# Patient Record
Sex: FEMALE | Race: WHITE
[De-identification: names, ages, dates, MRNs, and addresses within clinical notes are randomized per-mention and may not be internally consistent; named-entity substitution may affect disease eponyms.]

---

## 2019-04-01 ENCOUNTER — APPOINTMENT (OUTPATIENT)
Dept: OTOLARYNGOLOGY | Facility: CLINIC | Age: 74
End: 2019-04-01
Payer: MEDICARE

## 2019-04-01 VITALS
BODY MASS INDEX: 33.32 KG/M2 | DIASTOLIC BLOOD PRESSURE: 69 MMHG | WEIGHT: 200 LBS | HEIGHT: 65 IN | HEART RATE: 67 BPM | SYSTOLIC BLOOD PRESSURE: 135 MMHG

## 2019-04-01 DIAGNOSIS — Z87.09 PERSONAL HISTORY OF OTHER DISEASES OF THE RESPIRATORY SYSTEM: ICD-10-CM

## 2019-04-01 DIAGNOSIS — H90.5 UNSPECIFIED SENSORINEURAL HEARING LOSS: ICD-10-CM

## 2019-04-01 DIAGNOSIS — Z82.49 FAMILY HISTORY OF ISCHEMIC HEART DISEASE AND OTHER DISEASES OF THE CIRCULATORY SYSTEM: ICD-10-CM

## 2019-04-01 DIAGNOSIS — Z86.79 PERSONAL HISTORY OF OTHER DISEASES OF THE CIRCULATORY SYSTEM: ICD-10-CM

## 2019-04-01 DIAGNOSIS — Z82.0 FAMILY HISTORY OF EPILEPSY AND OTHER DISEASES OF THE NERVOUS SYSTEM: ICD-10-CM

## 2019-04-01 DIAGNOSIS — Z80.9 FAMILY HISTORY OF MALIGNANT NEOPLASM, UNSPECIFIED: ICD-10-CM

## 2019-04-01 DIAGNOSIS — Z86.39 PERSONAL HISTORY OF OTHER ENDOCRINE, NUTRITIONAL AND METABOLIC DISEASE: ICD-10-CM

## 2019-04-01 DIAGNOSIS — Z87.39 PERSONAL HISTORY OF OTHER DISEASES OF THE MUSCULOSKELETAL SYSTEM AND CONNECTIVE TISSUE: ICD-10-CM

## 2019-04-01 DIAGNOSIS — Z78.9 OTHER SPECIFIED HEALTH STATUS: ICD-10-CM

## 2019-04-01 PROCEDURE — 99203 OFFICE O/P NEW LOW 30 MIN: CPT | Mod: 25

## 2019-04-01 PROCEDURE — 69801 INCISE INNER EAR: CPT | Mod: LT

## 2019-04-01 RX ORDER — LISINOPRIL AND HYDROCHLOROTHIAZIDE TABLETS 10; 12.5 MG/1; MG/1
TABLET ORAL
Refills: 0 | Status: ACTIVE | COMMUNITY

## 2019-04-01 RX ORDER — OMEGA-3/DHA/EPA/FISH OIL 300-1000MG
CAPSULE ORAL
Refills: 0 | Status: ACTIVE | COMMUNITY

## 2019-04-01 RX ORDER — METFORMIN HYDROCHLORIDE 625 MG/1
TABLET ORAL
Refills: 0 | Status: ACTIVE | COMMUNITY

## 2019-04-01 RX ORDER — COLCHICINE 0.6 MG/1
TABLET ORAL
Refills: 0 | Status: ACTIVE | COMMUNITY

## 2019-04-01 RX ORDER — REPAGLINIDE 1 MG/1
TABLET ORAL
Refills: 0 | Status: ACTIVE | COMMUNITY

## 2019-04-01 RX ORDER — COLD-HOT PACK
EACH MISCELLANEOUS
Refills: 0 | Status: ACTIVE | COMMUNITY

## 2019-04-01 RX ORDER — METOPROLOL TARTRATE 75 MG/1
TABLET, FILM COATED ORAL
Refills: 0 | Status: ACTIVE | COMMUNITY

## 2019-04-01 RX ORDER — GABAPENTIN 100 MG/1
CAPSULE ORAL
Refills: 0 | Status: ACTIVE | COMMUNITY

## 2019-04-01 RX ORDER — AMLODIPINE BESYLATE 5 MG/1
TABLET ORAL
Refills: 0 | Status: ACTIVE | COMMUNITY

## 2019-04-01 RX ORDER — FUROSEMIDE 80 MG/1
TABLET ORAL
Refills: 0 | Status: ACTIVE | COMMUNITY

## 2019-04-01 RX ORDER — VITAMIN B COMPLEX
CAPSULE ORAL
Refills: 0 | Status: ACTIVE | COMMUNITY

## 2019-04-01 RX ORDER — LOSARTAN POTASSIUM 100 MG/1
TABLET, FILM COATED ORAL
Refills: 0 | Status: ACTIVE | COMMUNITY

## 2019-04-01 RX ORDER — GLIMEPIRIDE 4 MG/1
TABLET ORAL
Refills: 0 | Status: ACTIVE | COMMUNITY

## 2019-04-01 RX ORDER — ASPIRIN 81 MG
81 TABLET, DELAYED RELEASE (ENTERIC COATED) ORAL
Refills: 0 | Status: ACTIVE | COMMUNITY

## 2019-04-01 NOTE — DATA REVIEWED
[de-identified] : Recent audiometry provided and reviewed call in March 2019: Moderate to severe sensorineural hearing loss in the left ear with reduced word understanding.

## 2019-04-01 NOTE — PHYSICAL EXAM
[Midline] : trachea located in midline position [Normal] : no rashes [FreeTextEntry1] : Procedure: Microscopic Ear Exam\par \par Left ear:  Ear canal intact without inflammation or lesion.  \par Tympanic membrane intact without inflammation.\par \par Right ear:  Ear canal intact without inflammation or lesion.  \par Tympanic membrane intact without inflammation.\par \par Procedure: Left Intratympanic injection\par Management options reviewed in detail. All risks limitations complications and alternatives reviewed with the patient who agreed.\par Injection: dexamethasone 24mg/ml  0.3cc used\par Anesthesia: topical phenol \par Dwell Time: 20 Minutes\par Estimated Blood Loss: None\par Complications: None \par

## 2019-04-01 NOTE — ASSESSMENT
[FreeTextEntry1] : \par Sudden hearing loss left ear with concurrent vertigo. Vertigo has resolved. A significant hearing loss remains.\par \par Intratympanic steroid injection today.\par Further systemic steroids avoided due to diabetes. Followup recommended with repeat on Renae treat.\par \par MRI of the internal auditory canals requested.\par \par Repeat on audiometry next visit. Consider second injection.

## 2019-04-01 NOTE — HISTORY OF PRESENT ILLNESS
[de-identified] : LIYAH ZENDEJAS has a history of sudden vertigo and hearing loss on February 21 2019.  This included vomiting.  Treated with oral antibiotics. Vomiting improved but hearing did not.  Shell sound in the ear persisted.  2 weeks later treated with oral antibiotics and steroids with no perceived benefit. \par \par No prior history of infectious ear disease.\par Now without vertigo.\par No history of exposure to loud noise or music.

## 2019-04-01 NOTE — CONSULT LETTER
[Dear  ___] : Dear  [unfilled], [Please see my note below.] : Please see my note below. [DrTessie  ___] : Dr. BUSCH [FreeTextEntry2] : Dear none [FreeTextEntry1] : Thank you for allowing me to participate in the care of LIYAH ZENDEJAS .\par Please see the attached visit note.\par \par \par \par David Andersen\par Otology\par Department of Otolaryngology\par [facility]

## 2019-04-10 ENCOUNTER — FORM ENCOUNTER (OUTPATIENT)
Age: 74
End: 2019-04-10

## 2019-04-11 ENCOUNTER — OUTPATIENT (OUTPATIENT)
Dept: OUTPATIENT SERVICES | Facility: HOSPITAL | Age: 74
LOS: 1 days | Discharge: HOME | End: 2019-04-11
Payer: MEDICARE

## 2019-04-11 DIAGNOSIS — H90.5 UNSPECIFIED SENSORINEURAL HEARING LOSS: ICD-10-CM

## 2019-04-11 PROCEDURE — 70553 MRI BRAIN STEM W/O & W/DYE: CPT | Mod: 26,76

## 2019-04-15 ENCOUNTER — APPOINTMENT (OUTPATIENT)
Age: 74
End: 2019-04-15
Payer: MEDICARE

## 2019-04-15 ENCOUNTER — APPOINTMENT (OUTPATIENT)
Dept: OTOLARYNGOLOGY | Facility: CLINIC | Age: 74
End: 2019-04-15
Payer: MEDICARE

## 2019-04-15 VITALS — HEIGHT: 65 IN | WEIGHT: 200 LBS | BODY MASS INDEX: 33.32 KG/M2

## 2019-04-15 DIAGNOSIS — H91.22 SUDDEN IDIOPATHIC HEARING LOSS, LEFT EAR: ICD-10-CM

## 2019-04-15 DIAGNOSIS — H90.3 SENSORINEURAL HEARING LOSS, BILATERAL: ICD-10-CM

## 2019-04-15 PROCEDURE — 99213 OFFICE O/P EST LOW 20 MIN: CPT | Mod: 25

## 2019-04-15 PROCEDURE — 92557 COMPREHENSIVE HEARING TEST: CPT

## 2019-04-15 PROCEDURE — 92567 TYMPANOMETRY: CPT

## 2019-04-15 PROCEDURE — 92504 EAR MICROSCOPY EXAMINATION: CPT

## 2019-04-15 NOTE — PHYSICAL EXAM
[Normal] : lingual tonsils are normal [Midline] : trachea located in midline position [FreeTextEntry1] : Procedure: Microscopic Ear Exam\par \par Right ear:  \par Ear canal intact without inflammation or lesion.  \par Tympanic membrane intact without inflammation.\par \par Left ear:  \par Small puncture mostly healed in the anterior superior quadrant. The posterior puncture is healed. No inflammation. The tympanic membrane appears otherwise normal.

## 2019-04-15 NOTE — ASSESSMENT
[FreeTextEntry1] : No apparent response to oral and intratympanic steroids now approximately 2 months since the onset of sudden hearing loss in the left ear. The likelihood of intervention leading to significant improvement is small. I discussed this with her including management options. She did not wish to proceed with further intratympanic therapies.\par \par I have recommended continued clinical monitoring. Pending the outcome of spontaneous healing, further management of hearing loss and tinnitus will be discussed including the possible use of implantable hearing devices.\par \par Followup recommended in 3 months.

## 2019-04-15 NOTE — CONSULT LETTER
[Dear  ___] : Dear  [unfilled], [FreeTextEntry1] : Thank you for allowing me to participate in the care of LIYAH ZENDEJAS .\par Please see the attached visit note.\par \par \par \par David Andersen\par Otology\par Department of Otolaryngology\par [facility]

## 2019-04-15 NOTE — HISTORY OF PRESENT ILLNESS
[de-identified] : LIYAH ZENDEJAS has a history of sudden vertigo and hearing loss on February 21 2019. This included vomiting. Treated with oral antibiotics. Vomiting improved but hearing did not. Shell sound in the ear persisted. 2 weeks later treated with oral antibiotics and steroids with no perceived benefit. \par \par No prior history of infectious ear disease.\par Now without vertigo.\par No history of exposure to loud noise or music. \par \par \par Followup for sudden hearing loss in the left ear. Underwent left intratympanic steroid injection on April 1. No reported change in hearing reported. No ear pain or otorrhea reported. Nov vertigo.

## 2019-04-25 ENCOUNTER — OUTPATIENT (OUTPATIENT)
Dept: OUTPATIENT SERVICES | Facility: HOSPITAL | Age: 74
LOS: 1 days | Discharge: HOME | End: 2019-04-25
Payer: MEDICARE

## 2019-04-25 DIAGNOSIS — I65.29 OCCLUSION AND STENOSIS OF UNSPECIFIED CAROTID ARTERY: ICD-10-CM

## 2019-04-25 PROCEDURE — 70498 CT ANGIOGRAPHY NECK: CPT | Mod: 26

## 2019-07-19 ENCOUNTER — OUTPATIENT (OUTPATIENT)
Dept: OUTPATIENT SERVICES | Facility: HOSPITAL | Age: 74
LOS: 1 days | Discharge: HOME | End: 2019-07-19
Payer: MEDICARE

## 2019-07-19 DIAGNOSIS — R10.11 RIGHT UPPER QUADRANT PAIN: ICD-10-CM

## 2019-07-19 PROCEDURE — 76705 ECHO EXAM OF ABDOMEN: CPT | Mod: 26

## 2019-08-19 ENCOUNTER — APPOINTMENT (OUTPATIENT)
Dept: OTOLARYNGOLOGY | Facility: CLINIC | Age: 74
End: 2019-08-19

## 2020-09-23 ENCOUNTER — INPATIENT (INPATIENT)
Facility: HOSPITAL | Age: 75
LOS: 1 days | Discharge: HOME | End: 2020-09-25
Attending: INTERNAL MEDICINE | Admitting: INTERNAL MEDICINE
Payer: MEDICARE

## 2020-09-23 VITALS
HEART RATE: 100 BPM | TEMPERATURE: 98 F | WEIGHT: 229.94 LBS | RESPIRATION RATE: 16 BRPM | SYSTOLIC BLOOD PRESSURE: 145 MMHG | OXYGEN SATURATION: 97 % | DIASTOLIC BLOOD PRESSURE: 93 MMHG

## 2020-09-23 LAB
A1C WITH ESTIMATED AVERAGE GLUCOSE RESULT: 10.4 % — HIGH (ref 4–5.6)
ABO RH CONFIRMATION: SIGNIFICANT CHANGE UP
ALBUMIN SERPL ELPH-MCNC: 4.2 G/DL — SIGNIFICANT CHANGE UP (ref 3.5–5.2)
ALP SERPL-CCNC: 78 U/L — SIGNIFICANT CHANGE UP (ref 30–115)
ALT FLD-CCNC: 50 U/L — HIGH (ref 0–41)
ANION GAP SERPL CALC-SCNC: 16 MMOL/L — HIGH (ref 7–14)
APTT BLD: 31.3 SEC — SIGNIFICANT CHANGE UP (ref 27–39.2)
AST SERPL-CCNC: 36 U/L — SIGNIFICANT CHANGE UP (ref 0–41)
BASE EXCESS BLDV CALC-SCNC: 2.3 MMOL/L — HIGH (ref -2–2)
BASOPHILS # BLD AUTO: 0.04 K/UL — SIGNIFICANT CHANGE UP (ref 0–0.2)
BASOPHILS NFR BLD AUTO: 0.3 % — SIGNIFICANT CHANGE UP (ref 0–1)
BILIRUB SERPL-MCNC: 0.5 MG/DL — SIGNIFICANT CHANGE UP (ref 0.2–1.2)
BLD GP AB SCN SERPL QL: SIGNIFICANT CHANGE UP
BUN SERPL-MCNC: 28 MG/DL — HIGH (ref 10–20)
CA-I SERPL-SCNC: 1.17 MMOL/L — SIGNIFICANT CHANGE UP (ref 1.12–1.3)
CALCIUM SERPL-MCNC: 9.8 MG/DL — SIGNIFICANT CHANGE UP (ref 8.5–10.1)
CHLORIDE SERPL-SCNC: 98 MMOL/L — SIGNIFICANT CHANGE UP (ref 98–110)
CO2 SERPL-SCNC: 21 MMOL/L — SIGNIFICANT CHANGE UP (ref 17–32)
CREAT SERPL-MCNC: 1.2 MG/DL — SIGNIFICANT CHANGE UP (ref 0.7–1.5)
EOSINOPHIL # BLD AUTO: 0.03 K/UL — SIGNIFICANT CHANGE UP (ref 0–0.7)
EOSINOPHIL NFR BLD AUTO: 0.2 % — SIGNIFICANT CHANGE UP (ref 0–8)
ESTIMATED AVERAGE GLUCOSE: 252 MG/DL — HIGH (ref 68–114)
GAS PNL BLDV: 133 MMOL/L — LOW (ref 136–145)
GAS PNL BLDV: SIGNIFICANT CHANGE UP
GLUCOSE BLDC GLUCOMTR-MCNC: 239 MG/DL — HIGH (ref 70–99)
GLUCOSE BLDC GLUCOMTR-MCNC: 325 MG/DL — HIGH (ref 70–99)
GLUCOSE BLDC GLUCOMTR-MCNC: 435 MG/DL — HIGH (ref 70–99)
GLUCOSE BLDC GLUCOMTR-MCNC: 451 MG/DL — CRITICAL HIGH (ref 70–99)
GLUCOSE BLDC GLUCOMTR-MCNC: 480 MG/DL — CRITICAL HIGH (ref 70–99)
GLUCOSE BLDC GLUCOMTR-MCNC: 555 MG/DL — CRITICAL HIGH (ref 70–99)
GLUCOSE SERPL-MCNC: 415 MG/DL — HIGH (ref 70–99)
HCO3 BLDV-SCNC: 27 MMOL/L — SIGNIFICANT CHANGE UP (ref 22–29)
HCT VFR BLD CALC: 42.3 % — SIGNIFICANT CHANGE UP (ref 37–47)
HCT VFR BLDA CALC: 39.7 % — SIGNIFICANT CHANGE UP (ref 34–44)
HGB BLD CALC-MCNC: 12.9 G/DL — LOW (ref 14–18)
HGB BLD-MCNC: 13.9 G/DL — SIGNIFICANT CHANGE UP (ref 12–16)
IMM GRANULOCYTES NFR BLD AUTO: 0.4 % — HIGH (ref 0.1–0.3)
INR BLD: 1.02 RATIO — SIGNIFICANT CHANGE UP (ref 0.65–1.3)
LACTATE BLDV-MCNC: 2.7 MMOL/L — HIGH (ref 0.5–1.6)
LACTATE SERPL-SCNC: 3.6 MMOL/L — HIGH (ref 0.7–2)
LIDOCAIN IGE QN: 57 U/L — SIGNIFICANT CHANGE UP (ref 7–60)
LYMPHOCYTES # BLD AUTO: 26.5 % — SIGNIFICANT CHANGE UP (ref 20.5–51.1)
LYMPHOCYTES # BLD AUTO: 3.62 K/UL — HIGH (ref 1.2–3.4)
MCHC RBC-ENTMCNC: 28.9 PG — SIGNIFICANT CHANGE UP (ref 27–31)
MCHC RBC-ENTMCNC: 32.9 G/DL — SIGNIFICANT CHANGE UP (ref 32–37)
MCV RBC AUTO: 87.9 FL — SIGNIFICANT CHANGE UP (ref 81–99)
MONOCYTES # BLD AUTO: 1.15 K/UL — HIGH (ref 0.1–0.6)
MONOCYTES NFR BLD AUTO: 8.4 % — SIGNIFICANT CHANGE UP (ref 1.7–9.3)
NEUTROPHILS # BLD AUTO: 8.76 K/UL — HIGH (ref 1.4–6.5)
NEUTROPHILS NFR BLD AUTO: 64.2 % — SIGNIFICANT CHANGE UP (ref 42.2–75.2)
NRBC # BLD: 0 /100 WBCS — SIGNIFICANT CHANGE UP (ref 0–0)
PCO2 BLDV: 44 MMHG — SIGNIFICANT CHANGE UP (ref 41–51)
PH BLDV: 7.41 — SIGNIFICANT CHANGE UP (ref 7.26–7.43)
PLATELET # BLD AUTO: 200 K/UL — SIGNIFICANT CHANGE UP (ref 130–400)
PO2 BLDV: 42 MMHG — HIGH (ref 20–40)
POTASSIUM BLDV-SCNC: 4.5 MMOL/L — SIGNIFICANT CHANGE UP (ref 3.3–5.6)
POTASSIUM SERPL-MCNC: 4.8 MMOL/L — SIGNIFICANT CHANGE UP (ref 3.5–5)
POTASSIUM SERPL-SCNC: 4.8 MMOL/L — SIGNIFICANT CHANGE UP (ref 3.5–5)
PROT SERPL-MCNC: 6.6 G/DL — SIGNIFICANT CHANGE UP (ref 6–8)
PROTHROM AB SERPL-ACNC: 11.7 SEC — SIGNIFICANT CHANGE UP (ref 9.95–12.87)
RBC # BLD: 4.81 M/UL — SIGNIFICANT CHANGE UP (ref 4.2–5.4)
RBC # FLD: 12.4 % — SIGNIFICANT CHANGE UP (ref 11.5–14.5)
SAO2 % BLDV: 75 % — SIGNIFICANT CHANGE UP
SODIUM SERPL-SCNC: 135 MMOL/L — SIGNIFICANT CHANGE UP (ref 135–146)
WBC # BLD: 13.66 K/UL — HIGH (ref 4.8–10.8)
WBC # FLD AUTO: 13.66 K/UL — HIGH (ref 4.8–10.8)

## 2020-09-23 PROCEDURE — 71045 X-RAY EXAM CHEST 1 VIEW: CPT | Mod: 26

## 2020-09-23 PROCEDURE — 93010 ELECTROCARDIOGRAM REPORT: CPT

## 2020-09-23 PROCEDURE — 74177 CT ABD & PELVIS W/CONTRAST: CPT | Mod: 26

## 2020-09-23 PROCEDURE — 99285 EMERGENCY DEPT VISIT HI MDM: CPT

## 2020-09-23 RX ORDER — SODIUM CHLORIDE 9 MG/ML
3200 INJECTION, SOLUTION INTRAVENOUS ONCE
Refills: 0 | Status: COMPLETED | OUTPATIENT
Start: 2020-09-23 | End: 2020-09-23

## 2020-09-23 RX ORDER — AMLODIPINE BESYLATE 2.5 MG/1
10 TABLET ORAL DAILY
Refills: 0 | Status: DISCONTINUED | OUTPATIENT
Start: 2020-09-23 | End: 2020-09-25

## 2020-09-23 RX ORDER — CIPROFLOXACIN LACTATE 400MG/40ML
VIAL (ML) INTRAVENOUS
Refills: 0 | Status: DISCONTINUED | OUTPATIENT
Start: 2020-09-23 | End: 2020-09-25

## 2020-09-23 RX ORDER — PANTOPRAZOLE SODIUM 20 MG/1
40 TABLET, DELAYED RELEASE ORAL
Refills: 0 | Status: DISCONTINUED | OUTPATIENT
Start: 2020-09-23 | End: 2020-09-25

## 2020-09-23 RX ORDER — DEXTROSE 50 % IN WATER 50 %
12.5 SYRINGE (ML) INTRAVENOUS ONCE
Refills: 0 | Status: DISCONTINUED | OUTPATIENT
Start: 2020-09-23 | End: 2020-09-25

## 2020-09-23 RX ORDER — DEXTROSE 50 % IN WATER 50 %
25 SYRINGE (ML) INTRAVENOUS ONCE
Refills: 0 | Status: DISCONTINUED | OUTPATIENT
Start: 2020-09-23 | End: 2020-09-25

## 2020-09-23 RX ORDER — INSULIN LISPRO 100/ML
15 VIAL (ML) SUBCUTANEOUS ONCE
Refills: 0 | Status: COMPLETED | OUTPATIENT
Start: 2020-09-23 | End: 2020-09-23

## 2020-09-23 RX ORDER — LEVOTHYROXINE SODIUM 125 MCG
150 TABLET ORAL DAILY
Refills: 0 | Status: DISCONTINUED | OUTPATIENT
Start: 2020-09-23 | End: 2020-09-25

## 2020-09-23 RX ORDER — METRONIDAZOLE 500 MG
500 TABLET ORAL ONCE
Refills: 0 | Status: COMPLETED | OUTPATIENT
Start: 2020-09-23 | End: 2020-09-23

## 2020-09-23 RX ORDER — INSULIN LISPRO 100/ML
VIAL (ML) SUBCUTANEOUS
Refills: 0 | Status: DISCONTINUED | OUTPATIENT
Start: 2020-09-23 | End: 2020-09-25

## 2020-09-23 RX ORDER — INSULIN HUMAN 100 [IU]/ML
6 INJECTION, SOLUTION SUBCUTANEOUS ONCE
Refills: 0 | Status: COMPLETED | OUTPATIENT
Start: 2020-09-23 | End: 2020-09-23

## 2020-09-23 RX ORDER — ASPIRIN/CALCIUM CARB/MAGNESIUM 324 MG
81 TABLET ORAL DAILY
Refills: 0 | Status: DISCONTINUED | OUTPATIENT
Start: 2020-09-23 | End: 2020-09-25

## 2020-09-23 RX ORDER — CIPROFLOXACIN LACTATE 400MG/40ML
400 VIAL (ML) INTRAVENOUS ONCE
Refills: 0 | Status: COMPLETED | OUTPATIENT
Start: 2020-09-23 | End: 2020-09-23

## 2020-09-23 RX ORDER — METRONIDAZOLE 500 MG
500 TABLET ORAL EVERY 8 HOURS
Refills: 0 | Status: DISCONTINUED | OUTPATIENT
Start: 2020-09-23 | End: 2020-09-25

## 2020-09-23 RX ORDER — INFLUENZA VIRUS VACCINE 15; 15; 15; 15 UG/.5ML; UG/.5ML; UG/.5ML; UG/.5ML
0.5 SUSPENSION INTRAMUSCULAR ONCE
Refills: 0 | Status: DISCONTINUED | OUTPATIENT
Start: 2020-09-23 | End: 2020-09-25

## 2020-09-23 RX ORDER — ENOXAPARIN SODIUM 100 MG/ML
40 INJECTION SUBCUTANEOUS DAILY
Refills: 0 | Status: DISCONTINUED | OUTPATIENT
Start: 2020-09-23 | End: 2020-09-25

## 2020-09-23 RX ORDER — CIPROFLOXACIN LACTATE 400MG/40ML
400 VIAL (ML) INTRAVENOUS EVERY 12 HOURS
Refills: 0 | Status: DISCONTINUED | OUTPATIENT
Start: 2020-09-24 | End: 2020-09-25

## 2020-09-23 RX ORDER — SIMVASTATIN 20 MG/1
10 TABLET, FILM COATED ORAL AT BEDTIME
Refills: 0 | Status: DISCONTINUED | OUTPATIENT
Start: 2020-09-23 | End: 2020-09-25

## 2020-09-23 RX ORDER — DEXTROSE 50 % IN WATER 50 %
15 SYRINGE (ML) INTRAVENOUS ONCE
Refills: 0 | Status: DISCONTINUED | OUTPATIENT
Start: 2020-09-23 | End: 2020-09-25

## 2020-09-23 RX ORDER — METOPROLOL TARTRATE 50 MG
200 TABLET ORAL DAILY
Refills: 0 | Status: DISCONTINUED | OUTPATIENT
Start: 2020-09-23 | End: 2020-09-25

## 2020-09-23 RX ORDER — FUROSEMIDE 40 MG
20 TABLET ORAL DAILY
Refills: 0 | Status: DISCONTINUED | OUTPATIENT
Start: 2020-09-23 | End: 2020-09-25

## 2020-09-23 RX ORDER — INSULIN GLARGINE 100 [IU]/ML
20 INJECTION, SOLUTION SUBCUTANEOUS AT BEDTIME
Refills: 0 | Status: DISCONTINUED | OUTPATIENT
Start: 2020-09-23 | End: 2020-09-23

## 2020-09-23 RX ORDER — GLUCAGON INJECTION, SOLUTION 0.5 MG/.1ML
1 INJECTION, SOLUTION SUBCUTANEOUS ONCE
Refills: 0 | Status: DISCONTINUED | OUTPATIENT
Start: 2020-09-23 | End: 2020-09-25

## 2020-09-23 RX ORDER — SODIUM CHLORIDE 9 MG/ML
1000 INJECTION, SOLUTION INTRAVENOUS
Refills: 0 | Status: DISCONTINUED | OUTPATIENT
Start: 2020-09-23 | End: 2020-09-25

## 2020-09-23 RX ORDER — INSULIN GLARGINE 100 [IU]/ML
25 INJECTION, SOLUTION SUBCUTANEOUS AT BEDTIME
Refills: 0 | Status: DISCONTINUED | OUTPATIENT
Start: 2020-09-23 | End: 2020-09-25

## 2020-09-23 RX ORDER — CHLORHEXIDINE GLUCONATE 213 G/1000ML
1 SOLUTION TOPICAL DAILY
Refills: 0 | Status: DISCONTINUED | OUTPATIENT
Start: 2020-09-23 | End: 2020-09-25

## 2020-09-23 RX ORDER — LISINOPRIL 2.5 MG/1
40 TABLET ORAL DAILY
Refills: 0 | Status: DISCONTINUED | OUTPATIENT
Start: 2020-09-23 | End: 2020-09-25

## 2020-09-23 RX ADMIN — Medication 200 MILLIGRAM(S): at 14:28

## 2020-09-23 RX ADMIN — Medication 200 MILLIGRAM(S): at 18:19

## 2020-09-23 RX ADMIN — SODIUM CHLORIDE 3200 MILLILITER(S): 9 INJECTION, SOLUTION INTRAVENOUS at 11:45

## 2020-09-23 RX ADMIN — Medication 100 MILLIGRAM(S): at 21:25

## 2020-09-23 RX ADMIN — SIMVASTATIN 10 MILLIGRAM(S): 20 TABLET, FILM COATED ORAL at 21:25

## 2020-09-23 RX ADMIN — Medication 200 MILLIGRAM(S): at 17:59

## 2020-09-23 RX ADMIN — Medication 6: at 17:10

## 2020-09-23 RX ADMIN — Medication 15 UNIT(S): at 19:52

## 2020-09-23 RX ADMIN — Medication 100 MILLIGRAM(S): at 14:28

## 2020-09-23 RX ADMIN — INSULIN GLARGINE 25 UNIT(S): 100 INJECTION, SOLUTION SUBCUTANEOUS at 21:00

## 2020-09-23 RX ADMIN — AMLODIPINE BESYLATE 10 MILLIGRAM(S): 2.5 TABLET ORAL at 17:59

## 2020-09-23 RX ADMIN — SODIUM CHLORIDE 3200 MILLILITER(S): 9 INJECTION, SOLUTION INTRAVENOUS at 09:40

## 2020-09-23 RX ADMIN — INSULIN HUMAN 6 UNIT(S): 100 INJECTION, SOLUTION SUBCUTANEOUS at 18:11

## 2020-09-23 NOTE — ED PROVIDER NOTE - CARE PLAN
Principal Discharge DX:	Rectal bleed  Secondary Diagnosis:	Colitis  Secondary Diagnosis:	Lightheadedness

## 2020-09-23 NOTE — ED PROVIDER NOTE - CLINICAL SUMMARY MEDICAL DECISION MAKING FREE TEXT BOX
Pt presented to [facility] for crampy lower abdominal pain initially followed by watery BM, which evolved into bloody bowel movements, and then gross blood every 30mins. Associated lightheadedness prompted ED visit. VSS, nl conjunctiva, RRR, lungs ctab, abd soft, non-tender, skin color nl. EKG, labs, CT abd/pelvis reviewed. Labs showed mild leukocytosis, elevated lactate which improved with IVF. H/H stable however of course acute bleed may be adequately reflected in subsequent bloodwork obtained at later time. CT showed distal transverse and descending colon inflammation more consistent with colitis clinically speaking. Ischemic cannot be ruled out, especially given patient's Hx of b/l endarterectomies. Cipro/flagyl given and patient admitted for further mgmt and trend H/H.

## 2020-09-23 NOTE — H&P ADULT - ATTENDING COMMENTS
Patient seen today. Patient followed by Dr Cancino in the community. Patient states first started with rectal bleeding (BRBPR) on Monday night and today still having maroon colored stools. Patient has DM, and glucose has not been controlled.    NAD  Lungs clear  Heart RR, S1S2  Abdomen Obese, BS+, soft  Extremities no edema    labs noted    Complete Blood Count + Automated Diff (09.24.20 @ 08:09)   Hemoglobin: 12.2 g/dL   Mean Cell Hemoglobin: 29.4 pg   Mean Cell Hemoglobin Conc: 33.1 g/dL       Historical Values  Complete Blood Count + Automated Diff (09.24.20 @ 08:09)   Hemoglobin: 12.2 g/dL   Mean Cell Hemoglobin: 29.4 pg   Mean Cell Hemoglobin Conc: 33.1 g/dL   Complete Blood Count + Automated Diff (09.23.20 @ 09:30)   Hemoglobin: 13.9 g/dL   Mean Cell Hemoglobin: 28.9 pg   Mean Cell Hemoglobin Conc: 32.9 g/dL     FINDINGS:    LOWER CHEST: Bibasilar subsegmental atelectasis.    HEPATOBILIARY: Unremarkable.    SPLEEN: Unremarkable.    PANCREAS: Unremarkable.    ADRENAL GLANDS: Indeterminate 1.7 cm left adrenal nodule, not fully characterize however likely represent benign in absence of oncological history.    KIDNEYS: Indeterminate 1.1 cm right renal lesion (series 5, image 144). No hydronephrosis. Bilateral renal cysts and subcentimeter hypodense lesions, too small to characterize.    ABDOMINOPELVIC NODES: Unremarkable.    PELVIC ORGANS: Unremarkable.    PERITONEUM/MESENTERY/BOWEL: Long segment of abnormal circumferential wall thickening and pericolonic inflammatory change from distal transverse colon throughout the entirety of descending colon; no abscess. Scattered colonic diverticula. Trace free pelvic fluid.    BONES/SOFT TISSUES: Degenerative change, thoracolumbar spine.    OTHER: Mixed plaque throughout aorta and its major branches. Visualized proximal TAMARA patent. Small fat-containing umbilical hernia.      IMPRESSION:  1. Long segment of acute colitis from distal transverse colon throughout the entirety of descending colon; no abscess. Findings may be infectious, inflammatory or ischemic in etiology.    2. Indeterminate 1.1 cm right renal lesion. Outpatient MRI abdomen with and without IV contrast recommended to differentiate proteinaceous cyst from solid renal mass.      A/P  Acute Blood loss anemia secondary to GI Bleed  Colitis, LGIB  - GI eval noted  - for colonoscopy in am

## 2020-09-23 NOTE — ED PROVIDER NOTE - PMH
Essential hypertension    Hyperlipidemia, unspecified hyperlipidemia type    Hypothyroidism, unspecified type    Type 2 diabetes mellitus without complication, without long-term current use of insulin

## 2020-09-23 NOTE — H&P ADULT - NSHPSOCIALHISTORY_GEN_ALL_CORE
Lives with . Worked as a . Former Smoker 1 pack a day for 40 years, social drinker and denies any illicit drug use.

## 2020-09-23 NOTE — H&P ADULT - NSHPREVIEWOFSYSTEMS_GEN_ALL_CORE
CONSTITUTIONAL: No weakness, fevers or chills, no weight loss   EYES/ENT: No visual changes;  No vertigo or throat pain   NECK: No pain or stiffness  RESPIRATORY: No cough, wheezing, hemoptysis; No shortness of breath  CARDIOVASCULAR: No chest pain or palpitations  GASTROINTESTINAL: As above  GENITOURINARY: No dysuria, frequency or hematuria  NEUROLOGICAL: No numbness or weakness  All other review of systems is negative unless indicated above.

## 2020-09-23 NOTE — ED PROVIDER NOTE - OBJECTIVE STATEMENT
75F with PMH NIDDM, HTN, HLD, hypothyroidism, gout, on lasix, who presents to [facility] for bloody bowel movements. Starting at 6pm she developed crampy lower abdominal pain that was intermittent, associated with urge to defecate. At 7pm she developed bloody bowel movements that evolved into gross blood every 30mins, with a total of 12 episodes. She denies clots, mucous. Endorses associated lightheadedness. She had cologuard test done 1yr prior. Not sure of prior colonoscopies. Denies n/v, syncope, chest pain, sob. 75F with PMH NIDDM, HTN, HLD, hypothyroidism, gout, on lasix, who presents to [facility] for bloody bowel movements. Starting at 6pm she developed crampy lower abdominal pain that was intermittent, associated with urge to defecate. At 7pm she developed bloody bowel movements that evolved into gross blood every 30mins, with a total of 12 episodes. She denies clots, mucous. Endorses associated lightheadedness. She had cologuard test done 1yr prior. Never had a formal colonoscopy. Denies n/v, syncope, chest pain, sob.

## 2020-09-23 NOTE — H&P ADULT - NSHPLABSRESULTS_GEN_ALL_CORE
13.9   13.66 )-----------( 200      ( 23 Sep 2020 09:30 )             42.3       09-23    135  |  98  |  28<H>  ----------------------------<  415<H>  4.8   |  21  |  1.2    Ca    9.8      23 Sep 2020 09:30    TPro  6.6  /  Alb  4.2  /  TBili  0.5  /  DBili  x   /  AST  36  /  ALT  50<H>  /  AlkPhos  78  09-23                  PT/INR - ( 23 Sep 2020 09:30 )   PT: 11.70 sec;   INR: 1.02 ratio         PTT - ( 23 Sep 2020 09:30 )  PTT:31.3 sec          CAPILLARY BLOOD GLUCOSE      POCT Blood Glucose.: 480 mg/dL (23 Sep 2020 16:11)

## 2020-09-23 NOTE — ED PROVIDER NOTE - PHYSICAL EXAMINATION
CONSTITUTIONAL: Well-developed; well-nourished; in no acute distress. Sitting up and providing appropriate history and physical examination  SKIN: skin exam is warm and dry, no acute rash.  HEAD: Normocephalic; atraumatic.  EYES: No pallor. PERRL, 3 mm bilateral, no nystagmus, EOM intact; conjunctiva and sclera clear.  ENT: moist mm. No nasal discharge; airway clear.  NECK: Supple; non tender. + full passive ROM in all directions. No JVD  CARD: S1, S2 normal; no murmurs, gallops, or rubs. Regular rate and rhythm. + Symmetric Strong Pulses  RESP: No wheezes, rales or rhonchi. Good air movement bilaterally  ABD: soft; non-distended; non-tender. No Rebound, No Guarding, No signs of peritonitis, No CVA tenderness. No pulsatile abdominal mass. + Strong and Symmetric Pulses  EXT: Normal ROM. No clubbing, cyanosis or edema. Dp and Pt Pulses intact. Cap refill less than 3 seconds  NEURO: no sensory or motor deficits, Alert, oriented, grossly unremarkable. No Focal deficits. GCS 15.   PSYCH: Cooperative, appropriate. CONSTITUTIONAL: Well-developed; well-nourished; in no acute distress. Sitting up and providing appropriate history and physical examination  SKIN: skin exam is warm and dry, no acute rash.  HEAD: Normocephalic; atraumatic.  EYES: No pallor. PERRL, 3 mm bilateral, no nystagmus, EOM intact; conjunctiva and sclera clear.  ENT: moist mm. No nasal discharge; airway clear.  NECK: Supple; non tender. + full passive ROM in all directions. No JVD  CARD: S1, S2 normal; no murmurs, gallops, or rubs. Regular rate and rhythm. + Symmetric Strong Pulses  RESP: No wheezes, rales or rhonchi. Good air movement bilaterally  ABD: soft; non-distended; non-tender. No Rebound, No Guarding, No signs of peritonitis, No CVA tenderness. No pulsatile abdominal mass. + Strong and Symmetric Pulses  EXT: Normal ROM. No clubbing, cyanosis or edema. Dp and Pt Pulses intact. Cap refill less than 3 seconds  NEURO: no sensory or motor deficits, Alert, oriented, grossly unremarkable. No Focal deficits. GCS 15.   PSYCH: Cooperative, appropriate.    Rectal: (+) BRBPR, (+) noninflamed external hemorrhoids, no palpable masses, fissures, no rectal tenderness

## 2020-09-23 NOTE — ED PROVIDER NOTE - NS ED ROS FT
Constitutional: See HPI.  Eyes: No visual changes, eye pain or discharge. No Photophobia  ENMT: No hearing changes, pain, discharge or infections. No neck pain or stiffness. No limited ROM  Cardiac: No SOB or edema. No chest pain with exertion.  Respiratory: No cough or respiratory distress. No hemoptysis. No history of asthma or RAD.  GI: + abd pain, diarrhea. No nausea, vomiting.  : No dysuria, frequency or burning. No Discharge  MS: No myalgia, muscle weakness, joint pain or back pain.  Neuro: No headache or weakness. No LOC.  Skin: No skin rash.  Except as documented in the HPI, all other systems are negative.

## 2020-09-23 NOTE — ED ADULT TRIAGE NOTE - CHIEF COMPLAINT QUOTE
Pt c/o severe lower abdominal cramping since yesterday, along w/ rectal bleeding. Pt started having bloody BMs yesterday, and then began passing only bright red blood.

## 2020-09-23 NOTE — ED PROVIDER NOTE - PROGRESS NOTE DETAILS
JR: s/p ivf lactate improved from 3.6 to 2.7, ml elevated 2/2 to GI bleed. since blood loss recent, H/H stable, may drop tmw. pending CT. recommend admission for GI bleed, ml lower, doubt brisk upper gib.

## 2020-09-23 NOTE — H&P ADULT - ASSESSMENT
75 year old Female with Past Medical History of NIDDM, HTN, HLD, hypothyroidism, gout, Chronic Constipation, hemorrhoids, bilateral carotid endarterectomy presents for abdominal pain and blood in the stools.     Patients CT findings and history are more consistent with an infectious process with bleeding likely from her hemorrhoids. But given her history of bilateral carotid endarterectomies will get a CTA Abdomen to rule out any ischemic process. Even though would appear unlikely in absence of chronic symptoms and quick resolution of symptoms. Will consult GI for any possible further work up if needed.    # Transverse Colitis likely infectious vs ischemic unlikely  # H/O of Hemorrhoids  - Elevated White count to 13k, Lactate trending down)  - CT Abdomen shows colitis in transverse colon and descending colon  - Bright red blood in stools possibly from Hemorrhoids  - s/p 3.2L of LR in ED, Cipro and Flagyl one dose given  - Will continue Cipro and Flagyl for now  - Will get GI PCR, C. difficile ESR, CRP, Calprotectin  - Will get CTA Abdomen to rule out any ischemic process(unlikely but patient high risk)  - Will consult GI for further workup  - Increase diet as tolerate  - Trend hemoglobin    # Gout  - Continue Colchicine    # Hyperthyroidism  - Continue Levothyroxine 150 daily    # H/O of Carotid Endarterectomies  - Continue Asprin and Statin  - Follows with Dr. Palm  - Surgeries were 10 and 15 year ago respectively    # DLD  - Continue Statin    # DM II  - Hold oral Meds  - Can start on basal with sliding scale    # HTN  - Continue Amlodipine  - Continue Lisinopril 40 and hold Valsartan(Prescribed both)  - Continue Metoprolol and Lasix    # Possible Vitamin D deficiency  - Can resume supplementation at D/C    DVT: Lovenox  GI: Pantoprazole  Diet: Full Liquids  Dispo: Pending workup and resolution of symptoms.   75 year old Female with Past Medical History of NIDDM, HTN, HLD, hypothyroidism, gout, Chronic Constipation, hemorrhoids, bilateral carotid endarterectomy presents for abdominal pain and blood in the stools.     Patients CT findings and history are more consistent with an infectious process with bleeding likely from her hemorrhoids. But given her history of bilateral carotid endarterectomies will get a CTA Abdomen to rule out any ischemic process. Even though would appear unlikely in absence of chronic symptoms and quick resolution of symptoms. Will consult GI for any possible further work up if needed.    # Transverse Colitis likely infectious vs ischemic unlikely  # H/O of Hemorrhoids  - Elevated White count to 13k, Lactate trending down)  - CT Abdomen shows colitis in transverse colon and descending colon  - Bright red blood in stools possibly from Hemorrhoids  - s/p 3.2L of LR in ED, Cipro and Flagyl one dose given  - Will continue Cipro and Flagyl for now  - Will get GI PCR, C. difficile ESR, CRP, Calprotectin  - Will get CTA Abdomen to rule out any ischemic process(unlikely but patient high risk)  - Will consult GI for further workup  - Increase diet as tolerate  - Trend hemoglobin    # Gout  - Continue Colchicine    # Hyperthyroidism  - Continue Levothyroxine 150 daily    # H/O of Carotid Endarterectomies  - Continue Asprin and Statin  - Follows with Dr. Palm  - Surgeries were 10 and 15 year ago respectively    # DLD  - Continue Statin    # DM II  - Hold oral Meds  - Can start on basal with sliding scale    # HTN  - Continue Amlodipine  - Continue Lisinopril 40 and hold Valsartan(Prescribed both)  - Continue Metoprolol and Lasix    # Possible Vitamin D deficiency  - Can resume supplementation at D/C    # Obesity  - Dietary counselling      DVT: Lovenox  GI: Pantoprazole  Diet: Full Liquids  Dispo: Pending workup and resolution of symptoms.

## 2020-09-23 NOTE — H&P ADULT - HISTORY OF PRESENT ILLNESS
75 year old Female with Past Medical History of NIDDM, HTN, HLD, hypothyroidism, gout, Chronic Constipation, hemorrhoids, bilateral carotid endarterectomy presents for abdominal pain and blood in the stools.     As per patient she was in usual state of health till last evening when at 6pm she developed crampy 6-7/10 progressive lower abdominal pain, intermittent with urge to defecate relieved partially with defecation. Patient reports going to the washroom and had a watery bowel movement. Patients pain was partially relieved but an hour later had similar pain now associated with sweating, tenesmus and partially relieved with defecation. Patient says that on 3rd bowel movement she had a tablespoon of bright red blood with one to two clots mixed with the stools. She continued to have urge to defecate and bowel movements till the next morning for about 30 episodes. In the morning she continued to have the urge but says that some of the times she just had a table spoon of blood come out and no feces. At that point she decided to present to ED.    Of note patient reports a similar episode 10 days ago that resolved on its own. Also reports subjective fever last day when she graduated and found to be 99F and took aspirin for it.    Patient denies any food intake from outside, eating uncooked food, chest pain, heart issues, pain with meals, leg pain on ambulation, nausea, vomiting, diarrhea    In ED patient was hemodynamically stable, with elevated white count of 13.66 with lactate of 3.6. CT of Abdomen shows inflammation of the transverse and descending colon which could be inflammatory or ischemic in nature. Patient given 3.2L of fluids with improvement in lactate and Cipro+Flagyl. At time of interview patient denies any complaints and says that last she had a bowel movement more than 2 hours ago with resolution of pain and blood in stools.

## 2020-09-23 NOTE — H&P ADULT - NSHPPHYSICALEXAM_GEN_ALL_CORE
GENERAL: NAD, obese  HEAD:  Atraumatic, Normocephalic  EYES: EOMI, PERRLA, conjunctiva and sclera clear  ENT: Moist mucous membranes  NECK: Supple, No JVD  CHEST/LUNG: Clear to auscultation bilaterally; No rales, rhonchi, wheezing, or rubs. Unlabored respirations  HEART: Regular rate and rhythm; No murmurs, rubs, or gallops  ABDOMEN: Bowel sounds present; Soft, Nontender, Nondistended. No hepatomegaly  EXTREMITIES:  2+ Peripheral Pulses, brisk capillary refill. No clubbing, cyanosis, or edema  NERVOUS SYSTEM:  Alert & Oriented X3, speech clear. No deficits   MSK: FROM all 4 extremities, full and equal strength  SKIN: No rashes or lesions

## 2020-09-24 LAB
ALBUMIN SERPL ELPH-MCNC: 3.9 G/DL — SIGNIFICANT CHANGE UP (ref 3.5–5.2)
ALP SERPL-CCNC: 64 U/L — SIGNIFICANT CHANGE UP (ref 30–115)
ALT FLD-CCNC: 34 U/L — SIGNIFICANT CHANGE UP (ref 0–41)
ANION GAP SERPL CALC-SCNC: 12 MMOL/L — SIGNIFICANT CHANGE UP (ref 7–14)
ANION GAP SERPL CALC-SCNC: 9 MMOL/L — SIGNIFICANT CHANGE UP (ref 7–14)
APTT BLD: 27.7 SEC — SIGNIFICANT CHANGE UP (ref 27–39.2)
AST SERPL-CCNC: 25 U/L — SIGNIFICANT CHANGE UP (ref 0–41)
BASOPHILS # BLD AUTO: 0.02 K/UL — SIGNIFICANT CHANGE UP (ref 0–0.2)
BASOPHILS NFR BLD AUTO: 0.2 % — SIGNIFICANT CHANGE UP (ref 0–1)
BILIRUB SERPL-MCNC: 0.7 MG/DL — SIGNIFICANT CHANGE UP (ref 0.2–1.2)
BUN SERPL-MCNC: 28 MG/DL — HIGH (ref 10–20)
BUN SERPL-MCNC: 30 MG/DL — HIGH (ref 10–20)
C DIFF BY PCR RESULT: NEGATIVE — SIGNIFICANT CHANGE UP
C DIFF TOX GENS STL QL NAA+PROBE: SIGNIFICANT CHANGE UP
CALCIUM SERPL-MCNC: 9.3 MG/DL — SIGNIFICANT CHANGE UP (ref 8.5–10.1)
CALCIUM SERPL-MCNC: 9.5 MG/DL — SIGNIFICANT CHANGE UP (ref 8.5–10.1)
CHLORIDE SERPL-SCNC: 93 MMOL/L — LOW (ref 98–110)
CHLORIDE SERPL-SCNC: 95 MMOL/L — LOW (ref 98–110)
CHOLEST SERPL-MCNC: 157 MG/DL — SIGNIFICANT CHANGE UP (ref 100–200)
CO2 SERPL-SCNC: 25 MMOL/L — SIGNIFICANT CHANGE UP (ref 17–32)
CO2 SERPL-SCNC: 27 MMOL/L — SIGNIFICANT CHANGE UP (ref 17–32)
CREAT SERPL-MCNC: 1.2 MG/DL — SIGNIFICANT CHANGE UP (ref 0.7–1.5)
CREAT SERPL-MCNC: 1.4 MG/DL — SIGNIFICANT CHANGE UP (ref 0.7–1.5)
CRP SERPL-MCNC: 5.37 MG/DL — HIGH (ref 0–0.4)
CULTURE RESULTS: SIGNIFICANT CHANGE UP
EOSINOPHIL # BLD AUTO: 0.1 K/UL — SIGNIFICANT CHANGE UP (ref 0–0.7)
EOSINOPHIL NFR BLD AUTO: 0.8 % — SIGNIFICANT CHANGE UP (ref 0–8)
ERYTHROCYTE [SEDIMENTATION RATE] IN BLOOD: 28 MM/HR — HIGH (ref 0–20)
GLUCOSE BLDC GLUCOMTR-MCNC: 159 MG/DL — HIGH (ref 70–99)
GLUCOSE BLDC GLUCOMTR-MCNC: 265 MG/DL — HIGH (ref 70–99)
GLUCOSE BLDC GLUCOMTR-MCNC: 268 MG/DL — HIGH (ref 70–99)
GLUCOSE BLDC GLUCOMTR-MCNC: 333 MG/DL — HIGH (ref 70–99)
GLUCOSE SERPL-MCNC: 154 MG/DL — HIGH (ref 70–99)
GLUCOSE SERPL-MCNC: 326 MG/DL — HIGH (ref 70–99)
HCT VFR BLD CALC: 36.9 % — LOW (ref 37–47)
HCT VFR BLD CALC: 37.4 % — SIGNIFICANT CHANGE UP (ref 37–47)
HCV AB S/CO SERPL IA: 0.04 COI — SIGNIFICANT CHANGE UP
HCV AB SERPL-IMP: SIGNIFICANT CHANGE UP
HDLC SERPL-MCNC: 47 MG/DL — LOW
HGB BLD-MCNC: 12.2 G/DL — SIGNIFICANT CHANGE UP (ref 12–16)
HGB BLD-MCNC: 12.2 G/DL — SIGNIFICANT CHANGE UP (ref 12–16)
IMM GRANULOCYTES NFR BLD AUTO: 0.4 % — HIGH (ref 0.1–0.3)
INR BLD: 1.11 RATIO — SIGNIFICANT CHANGE UP (ref 0.65–1.3)
LACTATE SERPL-SCNC: 1.5 MMOL/L — SIGNIFICANT CHANGE UP (ref 0.7–2)
LIPID PNL WITH DIRECT LDL SERPL: 94 MG/DL — SIGNIFICANT CHANGE UP (ref 4–129)
LYMPHOCYTES # BLD AUTO: 18.7 % — LOW (ref 20.5–51.1)
LYMPHOCYTES # BLD AUTO: 2.48 K/UL — SIGNIFICANT CHANGE UP (ref 1.2–3.4)
MAGNESIUM SERPL-MCNC: 1.7 MG/DL — LOW (ref 1.8–2.4)
MAGNESIUM SERPL-MCNC: 2.2 MG/DL — SIGNIFICANT CHANGE UP (ref 1.8–2.4)
MCHC RBC-ENTMCNC: 29.2 PG — SIGNIFICANT CHANGE UP (ref 27–31)
MCHC RBC-ENTMCNC: 29.4 PG — SIGNIFICANT CHANGE UP (ref 27–31)
MCHC RBC-ENTMCNC: 32.6 G/DL — SIGNIFICANT CHANGE UP (ref 32–37)
MCHC RBC-ENTMCNC: 33.1 G/DL — SIGNIFICANT CHANGE UP (ref 32–37)
MCV RBC AUTO: 88.9 FL — SIGNIFICANT CHANGE UP (ref 81–99)
MCV RBC AUTO: 89.5 FL — SIGNIFICANT CHANGE UP (ref 81–99)
MONOCYTES # BLD AUTO: 1.19 K/UL — HIGH (ref 0.1–0.6)
MONOCYTES NFR BLD AUTO: 9 % — SIGNIFICANT CHANGE UP (ref 1.7–9.3)
NEUTROPHILS # BLD AUTO: 9.41 K/UL — HIGH (ref 1.4–6.5)
NEUTROPHILS NFR BLD AUTO: 70.9 % — SIGNIFICANT CHANGE UP (ref 42.2–75.2)
NRBC # BLD: 0 /100 WBCS — SIGNIFICANT CHANGE UP (ref 0–0)
NRBC # BLD: 0 /100 WBCS — SIGNIFICANT CHANGE UP (ref 0–0)
PHOSPHATE SERPL-MCNC: 3.4 MG/DL — SIGNIFICANT CHANGE UP (ref 2.1–4.9)
PLATELET # BLD AUTO: 162 K/UL — SIGNIFICANT CHANGE UP (ref 130–400)
PLATELET # BLD AUTO: 168 K/UL — SIGNIFICANT CHANGE UP (ref 130–400)
POTASSIUM SERPL-MCNC: 3.9 MMOL/L — SIGNIFICANT CHANGE UP (ref 3.5–5)
POTASSIUM SERPL-MCNC: 4.7 MMOL/L — SIGNIFICANT CHANGE UP (ref 3.5–5)
POTASSIUM SERPL-SCNC: 3.9 MMOL/L — SIGNIFICANT CHANGE UP (ref 3.5–5)
POTASSIUM SERPL-SCNC: 4.7 MMOL/L — SIGNIFICANT CHANGE UP (ref 3.5–5)
PROT SERPL-MCNC: 6.2 G/DL — SIGNIFICANT CHANGE UP (ref 6–8)
PROTHROM AB SERPL-ACNC: 12.8 SEC — SIGNIFICANT CHANGE UP (ref 9.95–12.87)
RBC # BLD: 4.15 M/UL — LOW (ref 4.2–5.4)
RBC # BLD: 4.18 M/UL — LOW (ref 4.2–5.4)
RBC # FLD: 12.5 % — SIGNIFICANT CHANGE UP (ref 11.5–14.5)
RBC # FLD: 12.6 % — SIGNIFICANT CHANGE UP (ref 11.5–14.5)
SARS-COV-2 IGG SERPL QL IA: NEGATIVE — SIGNIFICANT CHANGE UP
SARS-COV-2 IGM SERPL IA-ACNC: <0.1 INDEX — SIGNIFICANT CHANGE UP
SARS-COV-2 RNA SPEC QL NAA+PROBE: SIGNIFICANT CHANGE UP
SODIUM SERPL-SCNC: 130 MMOL/L — LOW (ref 135–146)
SODIUM SERPL-SCNC: 131 MMOL/L — LOW (ref 135–146)
SPECIMEN SOURCE: SIGNIFICANT CHANGE UP
TOTAL CHOLESTEROL/HDL RATIO MEASUREMENT: 3.3 RATIO — LOW (ref 4–5.5)
TRIGL SERPL-MCNC: 133 MG/DL — SIGNIFICANT CHANGE UP (ref 10–149)
WBC # BLD: 12.12 K/UL — HIGH (ref 4.8–10.8)
WBC # BLD: 13.25 K/UL — HIGH (ref 4.8–10.8)
WBC # FLD AUTO: 12.12 K/UL — HIGH (ref 4.8–10.8)
WBC # FLD AUTO: 13.25 K/UL — HIGH (ref 4.8–10.8)

## 2020-09-24 PROCEDURE — 99223 1ST HOSP IP/OBS HIGH 75: CPT

## 2020-09-24 RX ORDER — INSULIN LISPRO 100/ML
7 VIAL (ML) SUBCUTANEOUS
Refills: 0 | Status: DISCONTINUED | OUTPATIENT
Start: 2020-09-24 | End: 2020-09-25

## 2020-09-24 RX ORDER — MAGNESIUM SULFATE 500 MG/ML
2 VIAL (ML) INJECTION ONCE
Refills: 0 | Status: COMPLETED | OUTPATIENT
Start: 2020-09-24 | End: 2020-09-24

## 2020-09-24 RX ORDER — SOD SULF/SODIUM/NAHCO3/KCL/PEG
4000 SOLUTION, RECONSTITUTED, ORAL ORAL ONCE
Refills: 0 | Status: COMPLETED | OUTPATIENT
Start: 2020-09-24 | End: 2020-09-24

## 2020-09-24 RX ADMIN — Medication 20 MILLIGRAM(S): at 05:45

## 2020-09-24 RX ADMIN — AMLODIPINE BESYLATE 10 MILLIGRAM(S): 2.5 TABLET ORAL at 05:45

## 2020-09-24 RX ADMIN — SIMVASTATIN 10 MILLIGRAM(S): 20 TABLET, FILM COATED ORAL at 21:56

## 2020-09-24 RX ADMIN — Medication 200 MILLIGRAM(S): at 05:45

## 2020-09-24 RX ADMIN — PANTOPRAZOLE SODIUM 40 MILLIGRAM(S): 20 TABLET, DELAYED RELEASE ORAL at 05:45

## 2020-09-24 RX ADMIN — Medication 81 MILLIGRAM(S): at 11:27

## 2020-09-24 RX ADMIN — Medication 7 UNIT(S): at 17:15

## 2020-09-24 RX ADMIN — INSULIN GLARGINE 25 UNIT(S): 100 INJECTION, SOLUTION SUBCUTANEOUS at 21:53

## 2020-09-24 RX ADMIN — Medication 7 UNIT(S): at 12:08

## 2020-09-24 RX ADMIN — Medication 4: at 11:27

## 2020-09-24 RX ADMIN — LISINOPRIL 40 MILLIGRAM(S): 2.5 TABLET ORAL at 05:45

## 2020-09-24 RX ADMIN — Medication 200 MILLIGRAM(S): at 17:15

## 2020-09-24 RX ADMIN — Medication 4000 MILLILITER(S): at 15:36

## 2020-09-24 RX ADMIN — ENOXAPARIN SODIUM 40 MILLIGRAM(S): 100 INJECTION SUBCUTANEOUS at 11:28

## 2020-09-24 RX ADMIN — Medication 3: at 07:37

## 2020-09-24 RX ADMIN — Medication 100 MILLIGRAM(S): at 05:44

## 2020-09-24 RX ADMIN — Medication 150 MICROGRAM(S): at 05:45

## 2020-09-24 RX ADMIN — Medication 100 MILLIGRAM(S): at 21:55

## 2020-09-24 RX ADMIN — Medication 100 MILLIGRAM(S): at 16:36

## 2020-09-24 RX ADMIN — Medication 25 GRAM(S): at 15:37

## 2020-09-24 RX ADMIN — Medication 20 MILLIGRAM(S): at 21:53

## 2020-09-24 RX ADMIN — Medication 3: at 17:15

## 2020-09-24 NOTE — PROGRESS NOTE ADULT - SUBJECTIVE AND OBJECTIVE BOX
LIYAH ZENDEJAS 75y Female  MRN#: [id number]   Hospital Day: 1d    SUBJECTIVE  Patient is a 75y old Female who presents with a chief complaint of Abdominal Pain (24 Sep 2020 09:56)  Currently admitted to medicine with the primary diagnosis of Rectal bleed      INTERVAL HPI AND OVERNIGHT EVENTS:  Patient was examined and seen at bedside. This morning she is resting comfortably in bed. Pt states that in early AM, she had an "explosion" of BRBPR without any pain. pt denies any dizziness/weakness/fatigue/SOB/CP    REVIEW OF SYMPTOMS:  Negative unless o/w stated    OBJECTIVE  PAST MEDICAL & SURGICAL HISTORY  Hyperlipidemia, unspecified hyperlipidemia type    Hypothyroidism, unspecified type    Type 2 diabetes mellitus without complication, without long-term current use of insulin    Essential hypertension      ALLERGIES:  No Known Allergies    MEDICATIONS:  STANDING MEDICATIONS  amLODIPine   Tablet 10 milliGRAM(s) Oral daily  aspirin  chewable 81 milliGRAM(s) Oral daily  chlorhexidine 4% Liquid 1 Application(s) Topical daily  ciprofloxacin   IVPB      ciprofloxacin   IVPB 400 milliGRAM(s) IV Intermittent every 12 hours  dextrose 5%. 1000 milliLiter(s) IV Continuous <Continuous>  dextrose 50% Injectable 12.5 Gram(s) IV Push once  dextrose 50% Injectable 25 Gram(s) IV Push once  dextrose 50% Injectable 25 Gram(s) IV Push once  enoxaparin Injectable 40 milliGRAM(s) SubCutaneous daily  furosemide    Tablet 20 milliGRAM(s) Oral daily  influenza   Vaccine 0.5 milliLiter(s) IntraMuscular once  insulin glargine Injectable (LANTUS) 25 Unit(s) SubCutaneous at bedtime  insulin lispro (HumaLOG) corrective regimen sliding scale   SubCutaneous three times a day before meals  levothyroxine 150 MICROGram(s) Oral daily  lisinopril 40 milliGRAM(s) Oral daily  metoprolol succinate  milliGRAM(s) Oral daily  metroNIDAZOLE  IVPB 500 milliGRAM(s) IV Intermittent every 8 hours  pantoprazole    Tablet 40 milliGRAM(s) Oral before breakfast  simvastatin 10 milliGRAM(s) Oral at bedtime    PRN MEDICATIONS  dextrose 40% Gel 15 Gram(s) Oral once PRN  glucagon  Injectable 1 milliGRAM(s) IntraMuscular once PRN      VITAL SIGNS: Last 24 Hours  T(C): 35.9 (24 Sep 2020 07:30), Max: 37.6 (24 Sep 2020 01:00)  T(F): 96.6 (24 Sep 2020 07:30), Max: 99.7 (24 Sep 2020 01:00)  HR: 74 (24 Sep 2020 07:30) (68 - 100)  BP: 119/62 (24 Sep 2020 07:30) (116/58 - 157/85)  BP(mean): --  RR: 18 (24 Sep 2020 07:30) (18 - 18)  SpO2: 97% (24 Sep 2020 07:30) (96% - 97%)    LABS:                        12.2   13.25 )-----------( 162      ( 24 Sep 2020 08:09 )             36.9     09-24    131<L>  |  95<L>  |  28<H>  ----------------------------<  326<H>  4.7   |  27  |  1.2    Ca    9.3      24 Sep 2020 08:09  Phos  3.4     09-24  Mg     1.7     09-24    TPro  6.2  /  Alb  3.9  /  TBili  0.7  /  DBili  x   /  AST  25  /  ALT  34  /  AlkPhos  64  09-24    PT/INR - ( 23 Sep 2020 09:30 )   PT: 11.70 sec;   INR: 1.02 ratio         PTT - ( 23 Sep 2020 09:30 )  PTT:31.3 sec      Lactate, Blood: 1.5 mmol/L (09-24-20 @ 08:09)  Sedimentation Rate, Erythrocyte: 28 mm/Hr <H> (09-24-20 @ 08:09)          RADIOLOGY:      PHYSICAL EXAM:  CONSTITUTIONAL: No acute distress, well-developed, well-groomed, AAOx3  HEAD: Atraumatic, normocephalic  EYES: EOM intact, conjunctiva and sclera clear  ENT: Supple,   PULMONARY: Clear to auscultation bilaterally; no wheezes, rales, or rhonchi  CARDIOVASCULAR: Regular rate and rhythm; no murmurs, rubs, or gallops  GASTROINTESTINAL: Soft, non-tender, non-distended; bowel sounds present, no rebound tenderness or guarding.  MUSCULOSKELETAL: 2+ peripheral pulses; no clubbing, no cyanosis, Rt leg edema non pitting--chronic per pt  NEUROLOGY: non-focal  SKIN: No rashes or lesions; warm and dry. +pallor    ASSESSMENT & PLAN  # Rectal bleeding  -possibly due to hemorrhoids or colitis  # Transverse colitis  Likely infectious in nature--had been concern for ischemic colitis  -Spoke to radiologist who said that CT Angio would not yield any added benefit and that vessels were patent on his read and that only not reported because they were not asked to look at vessels. Said that on previous CT with IV contrast, vessels appeared patent.  -Lac 1.5 in AM  -C. diff -ve  >c/w cipro+flagyl  >f/u GI recs  >f/u GI PCR  >f/u ESR/CRP  >c/w clear liquid diet  >trend H/H (stable for now)    # HTN  - Continue Amlodipine  - Continue Lisinopril 40 and hold Valsartan(Prescribed both)  - Continue Metoprolol and Lasix    # H/o gout  -c/w colchicine    # Hyperthyroidism s/p thyroidectomy  -c/w levothyroxine    # H/o CEA  -consider holding ASA in light of bleeding  -c/w statin    #DM  -Uncontrolled on Basal (25)+SS (low)  -Will switch to basal (25) /bolus (7/7/7)   -f/u FS    #Misc:  DVT: Lovenox  GI: Pantoprazole  Diet: Full Liquids  Dispo: Pending workup and resolution of symptoms.        PAST MEDICAL & SURGICAL HISTORY:  Hyperlipidemia, unspecified hyperlipidemia type    Hypothyroidism, unspecified type    Type 2 diabetes mellitus without complication, without long-term current use of insulin    Essential hypertension        #Misc  - DVT Prophylaxis:  - GI Prophylaxis:  - Diet:  - Activity:  - IV Fluids:  - Code Status:    Dispo: LIYAH ZENDEJAS 75y Female  MRN#: [id number]   Hospital Day: 1d    SUBJECTIVE  Patient is a 75y old Female who presents with a chief complaint of Abdominal Pain (24 Sep 2020 09:56)  Currently admitted to medicine with the primary diagnosis of Rectal bleed      INTERVAL HPI AND OVERNIGHT EVENTS:  Patient was examined and seen at bedside. This morning she is resting comfortably in bed. Pt states that in early AM, she had an "explosion" of BRBPR without any pain. pt denies any dizziness/weakness/fatigue/SOB/CP    REVIEW OF SYMPTOMS:  Negative unless o/w stated    OBJECTIVE  PAST MEDICAL & SURGICAL HISTORY  Hyperlipidemia, unspecified hyperlipidemia type    Hypothyroidism, unspecified type    Type 2 diabetes mellitus without complication, without long-term current use of insulin    Essential hypertension      ALLERGIES:  No Known Allergies    MEDICATIONS:  STANDING MEDICATIONS  amLODIPine   Tablet 10 milliGRAM(s) Oral daily  aspirin  chewable 81 milliGRAM(s) Oral daily  chlorhexidine 4% Liquid 1 Application(s) Topical daily  ciprofloxacin   IVPB      ciprofloxacin   IVPB 400 milliGRAM(s) IV Intermittent every 12 hours  dextrose 5%. 1000 milliLiter(s) IV Continuous <Continuous>  dextrose 50% Injectable 12.5 Gram(s) IV Push once  dextrose 50% Injectable 25 Gram(s) IV Push once  dextrose 50% Injectable 25 Gram(s) IV Push once  enoxaparin Injectable 40 milliGRAM(s) SubCutaneous daily  furosemide    Tablet 20 milliGRAM(s) Oral daily  influenza   Vaccine 0.5 milliLiter(s) IntraMuscular once  insulin glargine Injectable (LANTUS) 25 Unit(s) SubCutaneous at bedtime  insulin lispro (HumaLOG) corrective regimen sliding scale   SubCutaneous three times a day before meals  levothyroxine 150 MICROGram(s) Oral daily  lisinopril 40 milliGRAM(s) Oral daily  metoprolol succinate  milliGRAM(s) Oral daily  metroNIDAZOLE  IVPB 500 milliGRAM(s) IV Intermittent every 8 hours  pantoprazole    Tablet 40 milliGRAM(s) Oral before breakfast  simvastatin 10 milliGRAM(s) Oral at bedtime    PRN MEDICATIONS  dextrose 40% Gel 15 Gram(s) Oral once PRN  glucagon  Injectable 1 milliGRAM(s) IntraMuscular once PRN      VITAL SIGNS: Last 24 Hours  T(C): 35.9 (24 Sep 2020 07:30), Max: 37.6 (24 Sep 2020 01:00)  T(F): 96.6 (24 Sep 2020 07:30), Max: 99.7 (24 Sep 2020 01:00)  HR: 74 (24 Sep 2020 07:30) (68 - 100)  BP: 119/62 (24 Sep 2020 07:30) (116/58 - 157/85)  BP(mean): --  RR: 18 (24 Sep 2020 07:30) (18 - 18)  SpO2: 97% (24 Sep 2020 07:30) (96% - 97%)    LABS:                        12.2   13.25 )-----------( 162      ( 24 Sep 2020 08:09 )             36.9     09-24    131<L>  |  95<L>  |  28<H>  ----------------------------<  326<H>  4.7   |  27  |  1.2    Ca    9.3      24 Sep 2020 08:09  Phos  3.4     09-24  Mg     1.7     09-24    TPro  6.2  /  Alb  3.9  /  TBili  0.7  /  DBili  x   /  AST  25  /  ALT  34  /  AlkPhos  64  09-24    PT/INR - ( 23 Sep 2020 09:30 )   PT: 11.70 sec;   INR: 1.02 ratio         PTT - ( 23 Sep 2020 09:30 )  PTT:31.3 sec      Lactate, Blood: 1.5 mmol/L (09-24-20 @ 08:09)  Sedimentation Rate, Erythrocyte: 28 mm/Hr <H> (09-24-20 @ 08:09)          RADIOLOGY:      PHYSICAL EXAM:  CONSTITUTIONAL: No acute distress, well-developed, well-groomed, AAOx3  HEAD: Atraumatic, normocephalic  EYES: EOM intact, conjunctiva and sclera clear  ENT: Supple,   PULMONARY: Clear to auscultation bilaterally; no wheezes, rales, or rhonchi  CARDIOVASCULAR: Regular rate and rhythm; no murmurs, rubs, or gallops  GASTROINTESTINAL: Soft, non-tender, non-distended; bowel sounds present, no rebound tenderness or guarding.  MUSCULOSKELETAL: 2+ peripheral pulses; no clubbing, no cyanosis, Rt leg edema non pitting--chronic per pt  NEUROLOGY: non-focal  SKIN: No rashes or lesions; warm and dry. +pallor    ASSESSMENT & PLAN  # Rectal bleeding  -possibly due to hemorrhoids or colitis  # Transverse colitis  Etiologies include inflammatory vs infectious--had been concern for ischemic colitis but suspicion has since subsided  -Spoke to radiologist who said that CT Angio would not yield any added benefit and that vessels were patent on his read and that only not reported because they were not asked to look at vessels. Said that on previous CT with IV contrast, vessels appeared patent.  -Lac 1.5 in AM  -C. diff -ve  >c/w cipro+flagyl  >f/u GI recs--pt planned for colonoscopy tomorrow 9/25  >f/u GI PCR  >f/u ESR/CRP  >c/w clear liquid diet  >trend H/H (stable for now)    # HTN  - Continue Amlodipine  - Continue Lisinopril 40 and hold Valsartan(Prescribed both)  - Continue Metoprolol and Lasix    # H/o gout  -c/w colchicine    # Hyperthyroidism s/p thyroidectomy  -c/w levothyroxine    # H/o CEA  -consider holding ASA in light of bleeding  -c/w statin    #DM  -Uncontrolled on Basal (25)+SS (low)  -Will switch to basal (25) /bolus (7/7/7)   -f/u FS    #Misc:  DVT: Lovenox  GI: Pantoprazole  Diet: Full Liquids, NPO after MN  Dispo: Pending workup and resolution of symptoms.        PAST MEDICAL & SURGICAL HISTORY:  Hyperlipidemia, unspecified hyperlipidemia type    Hypothyroidism, unspecified type    Type 2 diabetes mellitus without complication, without long-term current use of insulin    Essential hypertension

## 2020-09-24 NOTE — CONSULT NOTE ADULT - SUBJECTIVE AND OBJECTIVE BOX
Gastroenterology Consultation:    Patient is a 75y old  Female who presents with a chief complaint of Abdominal Pain (23 Sep 2020 16:14)      Admitted on: 09-23-20  HPI:  75 year old Female with Past Medical History of NIDDM, HTN, HLD, hypothyroidism, gout, Chronic Constipation, hemorrhoids, bilateral carotid endarterectomy presents for abdominal pain and blood in the stools.     As per patient she was in usual state of health till Tuesday evening when at 6pm she developed crampy 6-7/10 progressive lower abdominal pain, intermittent with urge to defecate relieved partially with defecation. Patient reports going to the washroom and had a watery bowel movement. Patients pain was partially relieved but 30 min to an hour later had similar pain now associated with sweating, tenesmus and partially relieved with defecation. Patient says that on 3rd bowel movement she had a tablespoon of bright red blood with one to two clots mixed with the stools. She continued to have urge to defecate and bowel movements till the next morning for about 30 episodes. In the morning she continued to have the urge but says that some of the times she just had a table spoon of blood come out and no feces. At that point she decided to present to ED.  Patient reports a similar episode 10 days ago that resolved on its own. Patient denies any food intake from outside, eating uncooked food, chest pain, heart issues, Post prandial pain, weight loss, decrease appetite, nausea, vomiting, or diarrhea. Currently patient denies any complaints and says that last she had a bowel movement more than 1 hr ago with resolution of pain and blood in stools, pt endorses loosing 1/2 cup of blood when she went to the bathroom.     Pt is hemodynamically stable with temp 35.9, HR: 74, BP: 119/62, RR: 18, SpO2: 97%.  Hb of 12.2 from 13.9 with elevated white count of 13.25, elevated ESR, with lactate of 1.5 from 3.6. MARK is + for blood per rectum.   CT of Abdomen shows inflammation of the transverse and descending colon which could be inflammatory or ischemic in nature.      Prior records Reviewed (Y/N): Y  History obtained from person other than patient (Y/N): N    Prior EGD: none   Prior Colonoscopy: Cologuard, no colonoscopy       PAST MEDICAL & SURGICAL HISTORY:  Hyperlipidemia, unspecified hyperlipidemia type    Hypothyroidism, unspecified type    Type 2 diabetes mellitus without complication, without long-term current use of insulin    Essential hypertension        FAMILY HISTORY:      Social History:  Tobacco: 40 pk year. Quit 15 years ago  Alcohol: socially   Drugs: none     Home Medications:  amLODIPine 10 mg oral tablet: 1 tab(s) orally once a day (23 Sep 2020 09:32)  aspirin 81 mg oral tablet: orally once a day (23 Sep 2020 09:32)  calcium carbonate 1000 mg oral tablet, chewable: orally 2 times a day (23 Sep 2020 09:32)  Cinnamon: 1000 milligram(s) orally 2 times a day (23 Sep 2020 09:32)  colchicine 0.6 mg oral tablet: 1 tab(s) orally once a day (23 Sep 2020 09:32)  furosemide 20 mg oral tablet: 1 tab(s) orally once a day (23 Sep 2020 09:32)  glipiZIDE 10 mg oral tablet: orally 2 times a day (23 Sep 2020 09:32)  levothyroxine 150 mcg (0.15 mg) oral tablet: 1 tab(s) orally once a day (23 Sep 2020 09:32)  lisinopril 40 mg oral tablet: orally 2 times a day (23 Sep 2020 09:32)  magnesium citrate: 400  orally 2 times a day (23 Sep 2020 09:32)  metFORMIN 500 mg oral tablet: orally once a day (23 Sep 2020 09:32)  metoprolol succinate 200 mg oral tablet, extended release: 1 tab(s) orally once a day (23 Sep 2020 09:32)  potassium chloride 10 mEq oral tablet, extended release: orally once a day (23 Sep 2020 09:32)  repaglinide 2 mg oral tablet:  (23 Sep 2020 09:32)  rosuvastatin 5 mg oral tablet: 1 tab(s) orally once a day (23 Sep 2020 09:32)  valsartan: 100 milligram(s) orally once a day (23 Sep 2020 09:32)  Vitamin B-12: orally once a day (23 Sep 2020 09:32)  Vitamin D3 1000 intl units (25 mcg) oral tablet: orally 2 times a day (23 Sep 2020 09:32)  Zinc: 25  orally 2 times a day (23 Sep 2020 09:32)    MEDICATIONS  (STANDING):  amLODIPine   Tablet 10 milliGRAM(s) Oral daily  aspirin  chewable 81 milliGRAM(s) Oral daily  chlorhexidine 4% Liquid 1 Application(s) Topical daily  ciprofloxacin   IVPB      ciprofloxacin   IVPB 400 milliGRAM(s) IV Intermittent every 12 hours  dextrose 5%. 1000 milliLiter(s) (50 mL/Hr) IV Continuous <Continuous>  dextrose 50% Injectable 12.5 Gram(s) IV Push once  dextrose 50% Injectable 25 Gram(s) IV Push once  dextrose 50% Injectable 25 Gram(s) IV Push once  enoxaparin Injectable 40 milliGRAM(s) SubCutaneous daily  furosemide    Tablet 20 milliGRAM(s) Oral daily  influenza   Vaccine 0.5 milliLiter(s) IntraMuscular once  insulin glargine Injectable (LANTUS) 25 Unit(s) SubCutaneous at bedtime  insulin lispro (HumaLOG) corrective regimen sliding scale   SubCutaneous three times a day before meals  levothyroxine 150 MICROGram(s) Oral daily  lisinopril 40 milliGRAM(s) Oral daily  metoprolol succinate  milliGRAM(s) Oral daily  metroNIDAZOLE  IVPB 500 milliGRAM(s) IV Intermittent every 8 hours  pantoprazole    Tablet 40 milliGRAM(s) Oral before breakfast  simvastatin 10 milliGRAM(s) Oral at bedtime    MEDICATIONS  (PRN):  dextrose 40% Gel 15 Gram(s) Oral once PRN Blood Glucose LESS THAN 70 milliGRAM(s)/deciliter  glucagon  Injectable 1 milliGRAM(s) IntraMuscular once PRN Glucose LESS THAN 70 milligrams/deciliter      Allergies  No Known Allergies      Review of Systems:   Constitutional:  No Fever, No Chills  ENT/Mouth:  No Hearing Changes,  No Difficulty Swallowing  Eyes:  No Eye Pain, No Vision Changes  Cardiovascular:  No Chest Pain, No Palpitations  Respiratory:  No Cough, No Dyspnea  Gastrointestinal:  As described in HPI  Musculoskeletal:  No Joint Swelling, No Back Pain  Skin:  No Skin Lesions, No Jaundice  Neuro:  No Syncope, No Dizziness  Heme/Lymph:  No Bruising, No Bleeding.          Physical Examination:  T(C): 35.9 (09-24-20 @ 07:30), Max: 37.6 (09-24-20 @ 01:00)  HR: 74 (09-24-20 @ 07:30) (68 - 100)  BP: 119/62 (09-24-20 @ 07:30) (116/58 - 157/85)  RR: 18 (09-24-20 @ 07:30) (18 - 18)  SpO2: 97% (09-24-20 @ 07:30) (96% - 97%)  Height (cm): 165.1 (09-23-20 @ 16:22)  Weight (kg): 107.1 (09-23-20 @ 16:22)      Constitutional: No acute distress.  Eyes:. Conjunctivae are clear, Sclera is non-icteric.  Ears Nose and Throat: The external ears are normal appearing,  Oral mucosa is pink and moist.  Respiratory:  No signs of respiratory distress. Lung sounds are clear bilaterally.  Cardiovascular:  S1 S2, Regular rate and rhythm.  GI: Abdomen is soft, symmetric, and mildly tender to palpation in the lower abdomen without distention. There are no visible lesions. Bowel sounds are present and normoactive in all four quadrants. No masses, hepatomegaly, or splenomegaly are noted. Perianal Non-thrombosed hemorrhoids are present. MARK + for blood.   Neuro: No Tremor, No involuntary movements  Skin: No rashes, No Jaundice.          Data: (reviewed by attending)                        12.2   13.25 )-----------( 162      ( 24 Sep 2020 08:09 )             36.9     Hgb Trend:  12.2  09-24-20 @ 08:09  13.9  09-23-20 @ 09:30      09-24    131<L>  |  95<L>  |  28<H>  ----------------------------<  326<H>  4.7   |  27  |  1.2    Ca    9.3      24 Sep 2020 08:09  Phos  3.4     09-24  Mg     1.7     09-24    TPro  6.2  /  Alb  3.9  /  TBili  0.7  /  DBili  x   /  AST  25  /  ALT  34  /  AlkPhos  64  09-24    Liver panel trend:  TBili 0.7   /   AST 25   /   ALT 34   /   AlkP 64   /   Tptn 6.2   /   Alb 3.9    /   DBili --      09-24  TBili 0.5   /   AST 36   /   ALT 50   /   AlkP 78   /   Tptn 6.6   /   Alb 4.2    /   DBili --      09-23      PT/INR - ( 23 Sep 2020 09:30 )   PT: 11.70 sec;   INR: 1.02 ratio         PTT - ( 23 Sep 2020 09:30 )  PTT:31.3 sec        Radiology:(reviewed by attending)  CT Abdomen and Pelvis w/ IV Cont:   EXAM:  CT ABDOMEN AND PELVIS IC            PROCEDURE DATE:  09/23/2020            INTERPRETATION:  CLINICAL STATEMENT: Abdominal pain. Bloody diarrhea.    TECHNIQUE: Contiguous axial CT images were obtained from the lower chest to the pubic symphysis following administration of 100cc Optiray 320 intravenous contrast.  Oral contrast was not administered.  Reformatted images in the coronal and sagittal planes were acquired.    Comparison: None.    FINDINGS:    LOWER CHEST: Bibasilar subsegmental atelectasis.    HEPATOBILIARY: Unremarkable.    SPLEEN: Unremarkable.    PANCREAS: Unremarkable.    ADRENAL GLANDS: Indeterminate 1.7 cm left adrenal nodule, not fully characterize however likely represent benign in absence of oncological history.    KIDNEYS: Indeterminate 1.1 cm right renal lesion (series 5, image 144). No hydronephrosis. Bilateral renal cysts and subcentimeter hypodense lesions, too small to characterize.    ABDOMINOPELVIC NODES: Unremarkable.    PELVIC ORGANS: Unremarkable.    PERITONEUM/MESENTERY/BOWEL: Long segment of abnormal circumferential wall thickening and pericolonic inflammatory change from distal transverse colon throughout the entirety of descending colon; no abscess. Scattered colonic diverticula. Trace free pelvic fluid.    BONES/SOFT TISSUES: Degenerative change, thoracolumbar spine.    OTHER: Mixed plaque throughout aorta and its major branches. Visualized proximal TAMARA patent. Small fat-containing umbilical hernia.      IMPRESSION:  1. Long segment of acute colitis from distal transverse colon throughout the entirety of descending colon; no abscess. Findings may be infectious, inflammatory or ischemic in etiology.    2. Indeterminate 1.1 cm right renal lesion. Outpatient MRI abdomen with and without IV contrast recommended to differentiate proteinaceous cyst from solid renal mass.                CLIFFORD JON M.D., ATTENDING RADIOLOGIST  This document has been electronically signed. Sep 23 2020  1:38PM (09-23-20 @ 13:36)       Gastroenterology Consultation:    Patient is a 75y old  Female who presents with a chief complaint of Abdominal Pain (23 Sep 2020 16:14)      Admitted on: 09-23-20  HPI:  75 year old Female with Past Medical History of NIDDM, HTN, HLD, hypothyroidism, gout, Chronic Constipation, hemorrhoids, bilateral carotid endarterectomy presents for abdominal pain and blood in the stools.     As per patient she was in usual state of health till Tuesday evening when at 6pm she developed crampy 6-7/10 progressive lower abdominal pain, intermittent with urge to defecate relieved partially with defecation. Patient reports going to the washroom and had a watery bowel movement. Patients pain was partially relieved but 30 min to an hour later had similar pain now associated with sweating, tenesmus and partially relieved with defecation. Patient says that on 3rd bowel movement she had a tablespoon of bright red blood with one to two clots mixed with the stools. She continued to have urge to defecate and bowel movements till the next morning for about 30 episodes. In the morning she continued to have the urge but says that some of the times she just had a table spoon of blood come out and no feces. At that point she decided to present to ED.    Patient reports a similar episode 10 days ago that resolved on its own. Patient denies any food intake from outside, eating uncooked food, chest pain, heart issues, Post prandial pain, weight loss, decrease appetite, nausea, vomiting, or diarrhea. Currently patient denies any complaints and says that last she had a bowel movement more than 1 hr ago with resolution of pain and blood in stools, pt endorses loosing 1/2 cup of blood when she went to the bathroom.     Pt is hemodynamically stable with temp 35.9, HR: 74, BP: 119/62, RR: 18, SpO2: 97%.  Hb of 12.2 from 13.9 with elevated white count of 13.25, elevated ESR, with lactate of 1.5 from 3.6. MARK is + for blood per rectum. CT of Abdomen shows inflammation of the transverse and descending colon which could be inflammatory or ischemic in nature.      Prior records Reviewed (Y/N): Y  History obtained from person other than patient (Y/N): N    Prior EGD: none   Prior Colonoscopy: Cologuard ~1 year ago, no colonoscopy       PAST MEDICAL & SURGICAL HISTORY:  Hyperlipidemia, unspecified hyperlipidemia type    Hypothyroidism, unspecified type    Type 2 diabetes mellitus without complication, without long-term current use of insulin    Essential hypertension        FAMILY HISTORY:      Social History:  Tobacco: 40 pk year. Quit 15 years ago  Alcohol: socially   Drugs: none     Home Medications:  amLODIPine 10 mg oral tablet: 1 tab(s) orally once a day (23 Sep 2020 09:32)  aspirin 81 mg oral tablet: orally once a day (23 Sep 2020 09:32)  calcium carbonate 1000 mg oral tablet, chewable: orally 2 times a day (23 Sep 2020 09:32)  Cinnamon: 1000 milligram(s) orally 2 times a day (23 Sep 2020 09:32)  colchicine 0.6 mg oral tablet: 1 tab(s) orally once a day (23 Sep 2020 09:32)  furosemide 20 mg oral tablet: 1 tab(s) orally once a day (23 Sep 2020 09:32)  glipiZIDE 10 mg oral tablet: orally 2 times a day (23 Sep 2020 09:32)  levothyroxine 150 mcg (0.15 mg) oral tablet: 1 tab(s) orally once a day (23 Sep 2020 09:32)  lisinopril 40 mg oral tablet: orally 2 times a day (23 Sep 2020 09:32)  magnesium citrate: 400  orally 2 times a day (23 Sep 2020 09:32)  metFORMIN 500 mg oral tablet: orally once a day (23 Sep 2020 09:32)  metoprolol succinate 200 mg oral tablet, extended release: 1 tab(s) orally once a day (23 Sep 2020 09:32)  potassium chloride 10 mEq oral tablet, extended release: orally once a day (23 Sep 2020 09:32)  repaglinide 2 mg oral tablet:  (23 Sep 2020 09:32)  rosuvastatin 5 mg oral tablet: 1 tab(s) orally once a day (23 Sep 2020 09:32)  valsartan: 100 milligram(s) orally once a day (23 Sep 2020 09:32)  Vitamin B-12: orally once a day (23 Sep 2020 09:32)  Vitamin D3 1000 intl units (25 mcg) oral tablet: orally 2 times a day (23 Sep 2020 09:32)  Zinc: 25  orally 2 times a day (23 Sep 2020 09:32)    MEDICATIONS  (STANDING):  amLODIPine   Tablet 10 milliGRAM(s) Oral daily  aspirin  chewable 81 milliGRAM(s) Oral daily  chlorhexidine 4% Liquid 1 Application(s) Topical daily  ciprofloxacin   IVPB      ciprofloxacin   IVPB 400 milliGRAM(s) IV Intermittent every 12 hours  dextrose 5%. 1000 milliLiter(s) (50 mL/Hr) IV Continuous <Continuous>  dextrose 50% Injectable 12.5 Gram(s) IV Push once  dextrose 50% Injectable 25 Gram(s) IV Push once  dextrose 50% Injectable 25 Gram(s) IV Push once  enoxaparin Injectable 40 milliGRAM(s) SubCutaneous daily  furosemide    Tablet 20 milliGRAM(s) Oral daily  influenza   Vaccine 0.5 milliLiter(s) IntraMuscular once  insulin glargine Injectable (LANTUS) 25 Unit(s) SubCutaneous at bedtime  insulin lispro (HumaLOG) corrective regimen sliding scale   SubCutaneous three times a day before meals  levothyroxine 150 MICROGram(s) Oral daily  lisinopril 40 milliGRAM(s) Oral daily  metoprolol succinate  milliGRAM(s) Oral daily  metroNIDAZOLE  IVPB 500 milliGRAM(s) IV Intermittent every 8 hours  pantoprazole    Tablet 40 milliGRAM(s) Oral before breakfast  simvastatin 10 milliGRAM(s) Oral at bedtime    MEDICATIONS  (PRN):  dextrose 40% Gel 15 Gram(s) Oral once PRN Blood Glucose LESS THAN 70 milliGRAM(s)/deciliter  glucagon  Injectable 1 milliGRAM(s) IntraMuscular once PRN Glucose LESS THAN 70 milligrams/deciliter      Allergies  No Known Allergies      Review of Systems:   Constitutional:  No Fever, No Chills  ENT/Mouth:  No Hearing Changes,  No Difficulty Swallowing  Eyes:  No Eye Pain, No Vision Changes  Cardiovascular:  No Chest Pain, No Palpitations  Respiratory:  No Cough, No Dyspnea  Gastrointestinal:  As described in HPI  Musculoskeletal:  No Joint Swelling, No Back Pain  Skin:  No Skin Lesions, No Jaundice  Neuro:  No Syncope, No Dizziness  Heme/Lymph:  No Bruising, No Bleeding.          Physical Examination:  T(C): 35.9 (09-24-20 @ 07:30), Max: 37.6 (09-24-20 @ 01:00)  HR: 74 (09-24-20 @ 07:30) (68 - 100)  BP: 119/62 (09-24-20 @ 07:30) (116/58 - 157/85)  RR: 18 (09-24-20 @ 07:30) (18 - 18)  SpO2: 97% (09-24-20 @ 07:30) (96% - 97%)  Height (cm): 165.1 (09-23-20 @ 16:22)  Weight (kg): 107.1 (09-23-20 @ 16:22)      Constitutional: No acute distress.  Eyes:. Conjunctivae are clear, Sclera is non-icteric.  Ears Nose and Throat: The external ears are normal appearing,  Oral mucosa is pink and moist.  Respiratory:  No signs of respiratory distress. Lung sounds are clear bilaterally.  Cardiovascular:  S1 S2, Regular rate and rhythm.  GI: Abdomen is soft, symmetric, and mildly tender to palpation in the lower abdomen without distention. There are no visible lesions. Bowel sounds are present and normoactive in all four quadrants. No masses, hepatomegaly, or splenomegaly are noted. Perianal Non-thrombosed hemorrhoids are present. MARK + for blood.   Neuro: No Tremor, No involuntary movements  Skin: No rashes, No Jaundice.          Data: (reviewed by attending)                        12.2   13.25 )-----------( 162      ( 24 Sep 2020 08:09 )             36.9     Hgb Trend:  12.2  09-24-20 @ 08:09  13.9  09-23-20 @ 09:30      09-24    131<L>  |  95<L>  |  28<H>  ----------------------------<  326<H>  4.7   |  27  |  1.2    Ca    9.3      24 Sep 2020 08:09  Phos  3.4     09-24  Mg     1.7     09-24    TPro  6.2  /  Alb  3.9  /  TBili  0.7  /  DBili  x   /  AST  25  /  ALT  34  /  AlkPhos  64  09-24    Liver panel trend:  TBili 0.7   /   AST 25   /   ALT 34   /   AlkP 64   /   Tptn 6.2   /   Alb 3.9    /   DBili --      09-24  TBili 0.5   /   AST 36   /   ALT 50   /   AlkP 78   /   Tptn 6.6   /   Alb 4.2    /   DBili --      09-23      PT/INR - ( 23 Sep 2020 09:30 )   PT: 11.70 sec;   INR: 1.02 ratio         PTT - ( 23 Sep 2020 09:30 )  PTT:31.3 sec        Radiology:(reviewed by attending)  CT Abdomen and Pelvis w/ IV Cont:   EXAM:  CT ABDOMEN AND PELVIS IC            PROCEDURE DATE:  09/23/2020            INTERPRETATION:  CLINICAL STATEMENT: Abdominal pain. Bloody diarrhea.    TECHNIQUE: Contiguous axial CT images were obtained from the lower chest to the pubic symphysis following administration of 100cc Optiray 320 intravenous contrast.  Oral contrast was not administered.  Reformatted images in the coronal and sagittal planes were acquired.    Comparison: None.    FINDINGS:    LOWER CHEST: Bibasilar subsegmental atelectasis.    HEPATOBILIARY: Unremarkable.    SPLEEN: Unremarkable.    PANCREAS: Unremarkable.    ADRENAL GLANDS: Indeterminate 1.7 cm left adrenal nodule, not fully characterize however likely represent benign in absence of oncological history.    KIDNEYS: Indeterminate 1.1 cm right renal lesion (series 5, image 144). No hydronephrosis. Bilateral renal cysts and subcentimeter hypodense lesions, too small to characterize.    ABDOMINOPELVIC NODES: Unremarkable.    PELVIC ORGANS: Unremarkable.    PERITONEUM/MESENTERY/BOWEL: Long segment of abnormal circumferential wall thickening and pericolonic inflammatory change from distal transverse colon throughout the entirety of descending colon; no abscess. Scattered colonic diverticula. Trace free pelvic fluid.    BONES/SOFT TISSUES: Degenerative change, thoracolumbar spine.    OTHER: Mixed plaque throughout aorta and its major branches. Visualized proximal TAMARA patent. Small fat-containing umbilical hernia.      IMPRESSION:  1. Long segment of acute colitis from distal transverse colon throughout the entirety of descending colon; no abscess. Findings may be infectious, inflammatory or ischemic in etiology.    2. Indeterminate 1.1 cm right renal lesion. Outpatient MRI abdomen with and without IV contrast recommended to differentiate proteinaceous cyst from solid renal mass.                CLIFFORD JON M.D., ATTENDING RADIOLOGIST  This document has been electronically signed. Sep 23 2020  1:38PM (09-23-20 @ 13:36)

## 2020-09-24 NOTE — CONSULT NOTE ADULT - ASSESSMENT
75 year old Female with Past Medical History of DM, HTN, HLD, hypothyroidism, gout, Chronic Constipation, hemorrhoids, bilateral carotid endarterectomy presents for abdominal pain and blood in the stools.       # Transverse Colitis likely infectious vs ischemic unlikely  # H/O of Hemorrhoids  - Elevated White count to 13k, (Lactate trending down)  - CT Abdomen shows colitis in transverse colon and descending colon  - Bright red blood in stools possibly from Hemorrhoids  - MARK + for blood   - C-diff -ve,   - ESR elevated   - pending GI PCR, CRP, Calprotectin  - Will get CTA Abdomen to rule out any ischemic process(unlikely but patient high risk)  - Increase diet as tolerate  - Trend hemoglobin    NOTE IS NO COMPLETE 75 year old Female with Past Medical History of DM, HTN, HLD, hypothyroidism, gout, Chronic Constipation, hemorrhoids, bilateral carotid endarterectomy presents for abdominal pain and blood in the stools.       # Transverse Colitis  infectious vs. inflammatory vs. ischemic   # H/O of Hemorrhoids  - Elevated White count to 13k, (Lactate trending down)  - CT Abdomen : " Long segment of acute colitis from distal transverse colon throughout the entirety of descending colon; no abscess. Findings may be infectious, inflammatory or ischemic in etiology".  - Bright red blood in stools. MARK + for blood   - C-diff -ve,   - ESR elevated     Plan  - Plan for colonoscopy tomorrow 09/25/2020  - Clear liquid diet until midnight   - NPO after midnight   - MiraLAX     Note is incomplete 75 year old Female with Past Medical History of DM, HTN, HLD, hypothyroidism, gout, Chronic Constipation, hemorrhoids, bilateral carotid endarterectomy presents for abdominal pain and blood in the stools.       # Transverse Colitis  infectious vs. inflammatory vs. ischemic   # H/O of Hemorrhoids  - Elevated White count to 13k, (Lactate trending down)  - CT Abdomen : " Long segment of acute colitis from distal transverse colon throughout the entirety of descending colon; no abscess. Findings may be infectious, inflammatory or ischemic in etiology".  - Bright red blood in stools. MARK + for blood   - C-diff -ve,   - ESR elevated     Plan  - Plan for colonoscopy tomorrow 09/25/2020  - Clear liquid diet until midnight   - NPO after midnight   - MiraLAX and Golytely

## 2020-09-25 ENCOUNTER — TRANSCRIPTION ENCOUNTER (OUTPATIENT)
Age: 75
End: 2020-09-25

## 2020-09-25 ENCOUNTER — RESULT REVIEW (OUTPATIENT)
Age: 75
End: 2020-09-25

## 2020-09-25 VITALS
DIASTOLIC BLOOD PRESSURE: 58 MMHG | RESPIRATION RATE: 18 BRPM | OXYGEN SATURATION: 97 % | HEART RATE: 76 BPM | SYSTOLIC BLOOD PRESSURE: 123 MMHG

## 2020-09-25 LAB
GLUCOSE BLDC GLUCOMTR-MCNC: 218 MG/DL — HIGH (ref 70–99)
GLUCOSE BLDC GLUCOMTR-MCNC: 228 MG/DL — HIGH (ref 70–99)
GLUCOSE BLDC GLUCOMTR-MCNC: 239 MG/DL — HIGH (ref 70–99)
GLUCOSE BLDC GLUCOMTR-MCNC: 242 MG/DL — HIGH (ref 70–99)
GLUCOSE BLDC GLUCOMTR-MCNC: 244 MG/DL — HIGH (ref 70–99)
SODIUM SERPL-SCNC: 134 MMOL/L — LOW (ref 135–146)

## 2020-09-25 PROCEDURE — 45380 COLONOSCOPY AND BIOPSY: CPT

## 2020-09-25 PROCEDURE — 88305 TISSUE EXAM BY PATHOLOGIST: CPT | Mod: 26

## 2020-09-25 RX ORDER — CIPROFLOXACIN LACTATE 400MG/40ML
1 VIAL (ML) INTRAVENOUS
Qty: 10 | Refills: 0
Start: 2020-09-25 | End: 2020-09-29

## 2020-09-25 RX ORDER — LISINOPRIL 2.5 MG/1
0 TABLET ORAL
Qty: 0 | Refills: 0 | DISCHARGE

## 2020-09-25 RX ORDER — LISINOPRIL 2.5 MG/1
1 TABLET ORAL
Qty: 0 | Refills: 0 | DISCHARGE
Start: 2020-09-25

## 2020-09-25 RX ORDER — METRONIDAZOLE 500 MG
1 TABLET ORAL
Qty: 15 | Refills: 0
Start: 2020-09-25 | End: 2020-09-29

## 2020-09-25 RX ADMIN — Medication 2: at 13:49

## 2020-09-25 RX ADMIN — Medication 150 MICROGRAM(S): at 06:20

## 2020-09-25 RX ADMIN — Medication 100 MILLIGRAM(S): at 06:19

## 2020-09-25 RX ADMIN — Medication 200 MILLIGRAM(S): at 06:20

## 2020-09-25 RX ADMIN — Medication 81 MILLIGRAM(S): at 13:47

## 2020-09-25 RX ADMIN — Medication 20 MILLIGRAM(S): at 06:20

## 2020-09-25 RX ADMIN — Medication 100 MILLIGRAM(S): at 13:47

## 2020-09-25 RX ADMIN — Medication 200 MILLIGRAM(S): at 06:19

## 2020-09-25 RX ADMIN — AMLODIPINE BESYLATE 10 MILLIGRAM(S): 2.5 TABLET ORAL at 06:20

## 2020-09-25 RX ADMIN — ENOXAPARIN SODIUM 40 MILLIGRAM(S): 100 INJECTION SUBCUTANEOUS at 13:48

## 2020-09-25 RX ADMIN — LISINOPRIL 40 MILLIGRAM(S): 2.5 TABLET ORAL at 06:20

## 2020-09-25 RX ADMIN — PANTOPRAZOLE SODIUM 40 MILLIGRAM(S): 20 TABLET, DELAYED RELEASE ORAL at 06:20

## 2020-09-25 RX ADMIN — Medication 7 UNIT(S): at 13:49

## 2020-09-25 NOTE — PROGRESS NOTE ADULT - ASSESSMENT
ASSESSMENT & PLAN  75 year old Female with Past Medical History of NIDDM, HTN, HLD, hypothyroidism, gout, Chronic Constipation, hemorrhoids, bilateral carotid endarterectomy presents for abdominal pain and blood in the stools.     Abdominal Pain, Lower GI Bleed  Acute Blood loss secondary to GI Bleed  Transverse colitis  - C. diff negative  - GI PCR negative  - remains on IV Cipro and Flagyl  - planned for colonoscopy today  - NPO for procedure  - H/H noted, continue to trend    HTN  - Continue Amlodipine  - Continue Lisinopril 40 and hold Valsartan(Prescribed both)  - Continue Metoprolol and Lasix    Gout  - on Colchicine chronically?    Hypothyroidism   - s/p thyroidectomy  - continue Levothyroxine    H/o CEA  - consider holding ASA in light of bleeding  - continue statin    DM  - Uncontrolled, now on Basal (25)+SS (low)  - Hgba1c > 10 on adm  - monitor FS    Misc:  DVT: Lovenox  GI: Pantoprazole  Diet: NPO   Dispo: Pending workup and resolution of symptoms.     ASSESSMENT & PLAN  75 year old Female with Past Medical History of NIDDM, HTN, HLD, hypothyroidism, gout, Chronic Constipation, hemorrhoids, bilateral carotid endarterectomy presents for abdominal pain and blood in the stools.     Abdominal Pain, Lower GI Bleed  Acute Blood loss secondary to GI Bleed  Transverse colitis  - C. diff negative  - GI PCR negative  - remains on IV Cipro and Flagyl  - planned for colonoscopy today  - NPO for procedure  - H/H noted, continue to trend    Intermediate Right Kidney Lesion  - will need outpatient MRI to determine if cystic vs solid  - further plan pending those results    HTN  - Continue Amlodipine  - Continue Lisinopril 40 and hold Valsartan(Prescribed both)  - Continue Metoprolol and Lasix    Gout  - on Colchicine chronically?    Hypothyroidism   - s/p thyroidectomy  - continue Levothyroxine    H/o CEA  - consider holding ASA in light of bleeding  - continue statin    DM  - Uncontrolled, now on Basal (25)+SS (low)  - Hgba1c > 10 on adm  - monitor FS    Misc:  DVT: Lovenox  GI: Pantoprazole  Diet: NPO   Dispo: Pending workup and resolution of symptoms.

## 2020-09-25 NOTE — PROGRESS NOTE ADULT - SUBJECTIVE AND OBJECTIVE BOX
LIYAH ZENDEJAS  75y  Female  HPI:  75 year old Female with Past Medical History of NIDDM, HTN, HLD, hypothyroidism, gout, Chronic Constipation, hemorrhoids, bilateral carotid endarterectomy presents for abdominal pain and blood in the stools.     As per patient she was in usual state of health till last evening when at 6pm she developed crampy 6-7/10 progressive lower abdominal pain, intermittent with urge to defecate relieved partially with defecation. Patient reports going to the washroom and had a watery bowel movement. Patients pain was partially relieved but an hour later had similar pain now associated with sweating, tenesmus and partially relieved with defecation. Patient says that on 3rd bowel movement she had a tablespoon of bright red blood with one to two clots mixed with the stools. She continued to have urge to defecate and bowel movements till the next morning for about 30 episodes. In the morning she continued to have the urge but says that some of the times she just had a table spoon of blood come out and no feces. At that point she decided to present to ED.    Of note patient reports a similar episode 10 days ago that resolved on its own. Also reports subjective fever last day when she graduated and found to be 99F and took aspirin for it.    Patient denies any food intake from outside, eating uncooked food, chest pain, heart issues, pain with meals, leg pain on ambulation, nausea, vomiting, diarrhea    In ED patient was hemodynamically stable, with elevated white count of 13.66 with lactate of 3.6. CT of Abdomen shows inflammation of the transverse and descending colon which could be inflammatory or ischemic in nature. Patient given 3.2L of fluids with improvement in lactate and Cipro+Flagyl. At time of interview patient denies any complaints and says that last she had a bowel movement more than 2 hours ago with resolution of pain and blood in stools. (23 Sep 2020 16:14)    MEDICATIONS  (STANDING):  amLODIPine   Tablet 10 milliGRAM(s) Oral daily  aspirin  chewable 81 milliGRAM(s) Oral daily  chlorhexidine 4% Liquid 1 Application(s) Topical daily  ciprofloxacin   IVPB      ciprofloxacin   IVPB 400 milliGRAM(s) IV Intermittent every 12 hours  dextrose 5%. 1000 milliLiter(s) (50 mL/Hr) IV Continuous <Continuous>  dextrose 50% Injectable 12.5 Gram(s) IV Push once  dextrose 50% Injectable 25 Gram(s) IV Push once  dextrose 50% Injectable 25 Gram(s) IV Push once  enoxaparin Injectable 40 milliGRAM(s) SubCutaneous daily  furosemide    Tablet 20 milliGRAM(s) Oral daily  influenza   Vaccine 0.5 milliLiter(s) IntraMuscular once  insulin glargine Injectable (LANTUS) 25 Unit(s) SubCutaneous at bedtime  insulin lispro (HumaLOG) corrective regimen sliding scale   SubCutaneous three times a day before meals  insulin lispro Injectable (HumaLOG) 7 Unit(s) SubCutaneous three times a day with meals  levothyroxine 150 MICROGram(s) Oral daily  lisinopril 40 milliGRAM(s) Oral daily  metoprolol succinate  milliGRAM(s) Oral daily  metroNIDAZOLE  IVPB 500 milliGRAM(s) IV Intermittent every 8 hours  pantoprazole    Tablet 40 milliGRAM(s) Oral before breakfast  simvastatin 10 milliGRAM(s) Oral at bedtime    MEDICATIONS  (PRN):  dextrose 40% Gel 15 Gram(s) Oral once PRN Blood Glucose LESS THAN 70 milliGRAM(s)/deciliter  glucagon  Injectable 1 milliGRAM(s) IntraMuscular once PRN Glucose LESS THAN 70 milligrams/deciliter    INTERVAL EVENTS: Patient seen today on call for colonoscopy. Patient was told earlier that procedure would be cancelled secondary to a low SNa which was repeated. Patient needed to be convinced for this procedure... will not do the prep again and will forego the procedure if not done today    T(C): 36.1 (09-25-20 @ 08:58), Max: 36.7 (09-24-20 @ 13:30)  HR: 83 (09-25-20 @ 08:58) (68 - 83)  BP: 117/97 (09-25-20 @ 08:58) (117/97 - 123/67)  RR: 18 (09-25-20 @ 08:58) (18 - 18)  SpO2: 97% (09-24-20 @ 13:30) (97% - 97%)  Wt(kg): --Vital Signs Last 24 Hrs  T(C): 36.1 (25 Sep 2020 08:58), Max: 36.7 (24 Sep 2020 13:30)  T(F): 97 (25 Sep 2020 08:58), Max: 98.1 (24 Sep 2020 13:30)  HR: 83 (25 Sep 2020 08:58) (68 - 83)  BP: 117/97 (25 Sep 2020 08:58) (117/97 - 123/67)  BP(mean): --  RR: 18 (25 Sep 2020 08:58) (18 - 18)  SpO2: 97% (24 Sep 2020 13:30) (97% - 97%)    PHYSICAL EXAM:  GENERAL: NAD  NECK: Supple, No JVD  CHEST/LUNG: Clear  HEART: S1, S2, Regular rate and rhythm  ABDOMEN: Soft, Nontender, Mildly distended; Bowel sounds present  EXTREMITIES: No edema    LABS:  Labs:                        12.2   12.12 )-----------( 168      ( 24 Sep 2020 22:57 )             37.4             09-25    134<L>  |  x   |  x   ----------------------------<  x   x    |  x   |  x       Basic Metabolic Panel (09.24.20 @ 22:57)   Sodium, Serum: 130 mmol/L   Potassium, Serum: 3.9 mmol/L   Chloride, Serum: 93 mmol/L   Carbon Dioxide, Serum: 25 mmol/L   Anion Gap, Serum: 12 mmol/L   Blood Urea Nitrogen, Serum: 30 mg/dL   Creatinine, Serum: 1.4 mg/dL   Glucose, Serum: 154 mg/dL   Calcium, Total Serum: 9.5 mg/dL   eGFR if Non : 37: Interpretative comment     Ca    9.5      24 Sep 2020 22:57  Phos  3.4     09-24  Mg     2.2     09-24    TPro  6.2  /  Alb  3.9  /  TBili  0.7  /  DBili  x   /  AST  25  /  ALT  34  /  AlkPhos  64  09-24    LIVER FUNCTIONS - ( 24 Sep 2020 08:09 )  Alb: 3.9 g/dL / Pro: 6.2 g/dL / ALK PHOS: 64 U/L / ALT: 34 U/L / AST: 25 U/L / GGT: x                 PT/INR - ( 24 Sep 2020 22:57 )   PT: 12.80 sec;   INR: 1.11 ratio         PTT - ( 24 Sep 2020 22:57 )  PTT:27.7 sec        GI PCR Panel, Stool (collected 24 Sep 2020 06:40)  Source: .Stool Feces  Final Report (24 Sep 2020 16:11):    GI PCR Results: NOT detected    *******Please Note:*******    GI panel PCR evaluates for:    Campylobacter, Plesiomonas shigelloides, Salmonella,    Vibrio, Yersinia enterocolitica, Enteroaggregative    Escherichia coli (EAEC), Enteropathogenic E.coli (EPEC),    Enterotoxigenic E. coli (ETEC) lt/st, Shiga-like    toxin-producing E. coli (STEC) stx1/stx2,    Shigella/ Enteroinvasive E. coli (EIEC), Cryptosporidium,    Cyclospora cayetanensis, Entamoeba histolytica,    Giardia lamblia, Adenovirus F 40/41, Astrovirus,    Norovirus GI/GII, Rotavirus A, Sapovirus    Culture - Blood (collected 23 Sep 2020 09:50)  Source: .Blood Blood-Peripheral  Preliminary Report (24 Sep 2020 23:02):    No growth to date.      RADIOLOGY & ADDITIONAL TESTS:  < from: CT Abdomen and Pelvis w/ IV Cont (09.23.20 @ 13:36) >  PROCEDURE DATE:  09/23/2020            INTERPRETATION:  CLINICAL STATEMENT: Abdominal pain. Bloody diarrhea.    TECHNIQUE: Contiguous axial CT images were obtained from the lower chest to the pubic symphysis following administration of 100cc Optiray 320 intravenous contrast.  Oral contrast was not administered.  Reformatted images in the coronal and sagittal planes were acquired.    Comparison: None.    FINDINGS:    LOWER CHEST: Bibasilar subsegmental atelectasis.    HEPATOBILIARY: Unremarkable.    SPLEEN: Unremarkable.    PANCREAS: Unremarkable.    ADRENAL GLANDS: Indeterminate 1.7 cm left adrenal nodule, not fully characterize however likely represent benign in absence of oncological history.    KIDNEYS: Indeterminate 1.1 cm right renal lesion (series 5, image 144). No hydronephrosis. Bilateral renal cysts and subcentimeter hypodense lesions, too small to characterize.    ABDOMINOPELVIC NODES: Unremarkable.    PELVIC ORGANS: Unremarkable.    PERITONEUM/MESENTERY/BOWEL: Long segment of abnormal circumferential wall thickening and pericolonic inflammatory change from distal transverse colon throughout the entirety of descending colon; no abscess. Scattered colonic diverticula. Trace free pelvic fluid.    BONES/SOFT TISSUES: Degenerative change, thoracolumbar spine.    OTHER: Mixed plaque throughout aorta and its major branches. Visualized proximal TAMARA patent. Small fat-containing umbilical hernia.      IMPRESSION:  1. Long segment of acute colitis from distal transverse colon throughout the entirety of descending colon; no abscess. Findings may be infectious, inflammatory or ischemic in etiology.    2. Indeterminate 1.1 cm right renal lesion. Outpatient MRI abdomen with and without IV contrast recommended to differentiate proteinaceous cyst from solid renal mass.                  < end of copied text >     LIYAH ZENDEJAS  75y  Female  HPI:  75 year old Female with Past Medical History of NIDDM, HTN, HLD, hypothyroidism, gout, Chronic Constipation, hemorrhoids, bilateral carotid endarterectomy presents for abdominal pain and blood in the stools.     As per patient she was in usual state of health till last evening when at 6pm she developed crampy 6-7/10 progressive lower abdominal pain, intermittent with urge to defecate relieved partially with defecation. Patient reports going to the washroom and had a watery bowel movement. Patients pain was partially relieved but an hour later had similar pain now associated with sweating, tenesmus and partially relieved with defecation. Patient says that on 3rd bowel movement she had a tablespoon of bright red blood with one to two clots mixed with the stools. She continued to have urge to defecate and bowel movements till the next morning for about 30 episodes. In the morning she continued to have the urge but says that some of the times she just had a table spoon of blood come out and no feces. At that point she decided to present to ED.    Of note patient reports a similar episode 10 days ago that resolved on its own. Also reports subjective fever last day when she graduated and found to be 99F and took aspirin for it.    Patient denies any food intake from outside, eating uncooked food, chest pain, heart issues, pain with meals, leg pain on ambulation, nausea, vomiting, diarrhea    In ED patient was hemodynamically stable, with elevated white count of 13.66 with lactate of 3.6. CT of Abdomen shows inflammation of the transverse and descending colon which could be inflammatory or ischemic in nature. Patient given 3.2L of fluids with improvement in lactate and Cipro+Flagyl. At time of interview patient denies any complaints and says that last she had a bowel movement more than 2 hours ago with resolution of pain and blood in stools. (23 Sep 2020 16:14)    MEDICATIONS  (STANDING):  amLODIPine   Tablet 10 milliGRAM(s) Oral daily  aspirin  chewable 81 milliGRAM(s) Oral daily  chlorhexidine 4% Liquid 1 Application(s) Topical daily  ciprofloxacin   IVPB      ciprofloxacin   IVPB 400 milliGRAM(s) IV Intermittent every 12 hours  dextrose 5%. 1000 milliLiter(s) (50 mL/Hr) IV Continuous <Continuous>  dextrose 50% Injectable 12.5 Gram(s) IV Push once  dextrose 50% Injectable 25 Gram(s) IV Push once  dextrose 50% Injectable 25 Gram(s) IV Push once  enoxaparin Injectable 40 milliGRAM(s) SubCutaneous daily  furosemide    Tablet 20 milliGRAM(s) Oral daily  influenza   Vaccine 0.5 milliLiter(s) IntraMuscular once  insulin glargine Injectable (LANTUS) 25 Unit(s) SubCutaneous at bedtime  insulin lispro (HumaLOG) corrective regimen sliding scale   SubCutaneous three times a day before meals  insulin lispro Injectable (HumaLOG) 7 Unit(s) SubCutaneous three times a day with meals  levothyroxine 150 MICROGram(s) Oral daily  lisinopril 40 milliGRAM(s) Oral daily  metoprolol succinate  milliGRAM(s) Oral daily  metroNIDAZOLE  IVPB 500 milliGRAM(s) IV Intermittent every 8 hours  pantoprazole    Tablet 40 milliGRAM(s) Oral before breakfast  simvastatin 10 milliGRAM(s) Oral at bedtime    MEDICATIONS  (PRN):  dextrose 40% Gel 15 Gram(s) Oral once PRN Blood Glucose LESS THAN 70 milliGRAM(s)/deciliter  glucagon  Injectable 1 milliGRAM(s) IntraMuscular once PRN Glucose LESS THAN 70 milligrams/deciliter    INTERVAL EVENTS: Patient seen today on call for colonoscopy. Patient was told earlier that procedure would be cancelled secondary to a low SNa which was repeated. Patient needed to be convinced for this procedure... will not do the prep again and will forego the procedure if not done today    T(C): 36.1 (09-25-20 @ 08:58), Max: 36.7 (09-24-20 @ 13:30)  HR: 83 (09-25-20 @ 08:58) (68 - 83)  BP: 117/97 (09-25-20 @ 08:58) (117/97 - 123/67)  RR: 18 (09-25-20 @ 08:58) (18 - 18)  SpO2: 97% (09-24-20 @ 13:30) (97% - 97%)  Wt(kg): --Vital Signs Last 24 Hrs  T(C): 36.1 (25 Sep 2020 08:58), Max: 36.7 (24 Sep 2020 13:30)  T(F): 97 (25 Sep 2020 08:58), Max: 98.1 (24 Sep 2020 13:30)  HR: 83 (25 Sep 2020 08:58) (68 - 83)  BP: 117/97 (25 Sep 2020 08:58) (117/97 - 123/67)  BP(mean): --  RR: 18 (25 Sep 2020 08:58) (18 - 18)  SpO2: 97% (24 Sep 2020 13:30) (97% - 97%)    PHYSICAL EXAM:  GENERAL: NAD  NECK: Supple, No JVD  CHEST/LUNG: Clear  HEART: S1, S2, Regular rate and rhythm  ABDOMEN: Soft, Nontender, Mildly distended; Bowel sounds present  EXTREMITIES: No edema    LABS:                        12.2   12.12 )-----------( 168      ( 24 Sep 2020 22:57 )             37.4             09-25    134<L>  |  x   |  x   ----------------------------<  x   x    |  x   |  x       Basic Metabolic Panel (09.24.20 @ 22:57)   Sodium, Serum: 130 mmol/L   Potassium, Serum: 3.9 mmol/L   Chloride, Serum: 93 mmol/L   Carbon Dioxide, Serum: 25 mmol/L   Anion Gap, Serum: 12 mmol/L   Blood Urea Nitrogen, Serum: 30 mg/dL   Creatinine, Serum: 1.4 mg/dL   Glucose, Serum: 154 mg/dL   Calcium, Total Serum: 9.5 mg/dL   eGFR if Non : 37: Interpretative comment     Ca    9.5      24 Sep 2020 22:57  Phos  3.4     09-24  Mg     2.2     09-24    TPro  6.2  /  Alb  3.9  /  TBili  0.7  /  DBili  x   /  AST  25  /  ALT  34  /  AlkPhos  64  09-24    LIVER FUNCTIONS - ( 24 Sep 2020 08:09 )  Alb: 3.9 g/dL / Pro: 6.2 g/dL / ALK PHOS: 64 U/L / ALT: 34 U/L / AST: 25 U/L / GGT: x                 PT/INR - ( 24 Sep 2020 22:57 )   PT: 12.80 sec;   INR: 1.11 ratio         PTT - ( 24 Sep 2020 22:57 )  PTT:27.7 sec        GI PCR Panel, Stool (collected 24 Sep 2020 06:40)  Source: .Stool Feces  Final Report (24 Sep 2020 16:11):    GI PCR Results: NOT detected    *******Please Note:*******    GI panel PCR evaluates for:    Campylobacter, Plesiomonas shigelloides, Salmonella,    Vibrio, Yersinia enterocolitica, Enteroaggregative    Escherichia coli (EAEC), Enteropathogenic E.coli (EPEC),    Enterotoxigenic E. coli (ETEC) lt/st, Shiga-like    toxin-producing E. coli (STEC) stx1/stx2,    Shigella/ Enteroinvasive E. coli (EIEC), Cryptosporidium,    Cyclospora cayetanensis, Entamoeba histolytica,    Giardia lamblia, Adenovirus F 40/41, Astrovirus,    Norovirus GI/GII, Rotavirus A, Sapovirus    Culture - Blood (collected 23 Sep 2020 09:50)  Source: .Blood Blood-Peripheral  Preliminary Report (24 Sep 2020 23:02):    No growth to date.      RADIOLOGY & ADDITIONAL TESTS:  < from: CT Abdomen and Pelvis w/ IV Cont (09.23.20 @ 13:36) >  PROCEDURE DATE:  09/23/2020            INTERPRETATION:  CLINICAL STATEMENT: Abdominal pain. Bloody diarrhea.    TECHNIQUE: Contiguous axial CT images were obtained from the lower chest to the pubic symphysis following administration of 100cc Optiray 320 intravenous contrast.  Oral contrast was not administered.  Reformatted images in the coronal and sagittal planes were acquired.    Comparison: None.    FINDINGS:    LOWER CHEST: Bibasilar subsegmental atelectasis.    HEPATOBILIARY: Unremarkable.    SPLEEN: Unremarkable.    PANCREAS: Unremarkable.    ADRENAL GLANDS: Indeterminate 1.7 cm left adrenal nodule, not fully characterize however likely represent benign in absence of oncological history.    KIDNEYS: Indeterminate 1.1 cm right renal lesion (series 5, image 144). No hydronephrosis. Bilateral renal cysts and subcentimeter hypodense lesions, too small to characterize.    ABDOMINOPELVIC NODES: Unremarkable.    PELVIC ORGANS: Unremarkable.    PERITONEUM/MESENTERY/BOWEL: Long segment of abnormal circumferential wall thickening and pericolonic inflammatory change from distal transverse colon throughout the entirety of descending colon; no abscess. Scattered colonic diverticula. Trace free pelvic fluid.    BONES/SOFT TISSUES: Degenerative change, thoracolumbar spine.    OTHER: Mixed plaque throughout aorta and its major branches. Visualized proximal TAMARA patent. Small fat-containing umbilical hernia.      IMPRESSION:  1. Long segment of acute colitis from distal transverse colon throughout the entirety of descending colon; no abscess. Findings may be infectious, inflammatory or ischemic in etiology.    2. Indeterminate 1.1 cm right renal lesion. Outpatient MRI abdomen with and without IV contrast recommended to differentiate proteinaceous cyst from solid renal mass.                  < end of copied text >

## 2020-09-25 NOTE — DISCHARGE NOTE PROVIDER - CARE PROVIDER_API CALL
Yuriy Cancino  HEMATOLOGY  [address] [place] [id number]  Phone: [phone]  Fax: [phone]  Follow Up Time:     Denny Bowling)  Gastroenterology; Internal Medicine  [address] [place] [address]  Phone: [phone]  Fax: [phone]  Follow Up Time:

## 2020-09-25 NOTE — DISCHARGE NOTE PROVIDER - NSDCCPCAREPLAN_GEN_ALL_CORE_FT
PRINCIPAL DISCHARGE DIAGNOSIS  Diagnosis: Colitis presumed to be due to infection  Assessment and Plan of Treatment: Your abdominal pain was likely secondary to inflammation of your transverse colon found on CT. You were started on IV antibiotics and your abdominal pain improved. You underwent a colonoscopy with the gasteroenterology team, in which they took biopsies of the inflammation in your colon and also found diverticulosis. Please follow up with Dr Bowling within two weeks for the results of the biopsy. Please continue your antibiotics for five more days. Should you have any acute episodes of diahrrhea, fever, recurrent abdominal pain, please return to the ED.      SECONDARY DISCHARGE DIAGNOSES  Diagnosis: Internal hemorrhoids  Assessment and Plan of Treatment: The bleeding that you experienced were likely due to your internal hemorroids found on colonoscopy. Your hemogloibin remained stable and you did not need any transfusions. Please follow up with Dr Bowling within two weeks. If you have any furhter epiosdes of large bouts of bleeding, low blood pressure, chest pain, palpitations, return to the ED.

## 2020-09-25 NOTE — DISCHARGE NOTE NURSING/CASE MANAGEMENT/SOCIAL WORK - PATIENT PORTAL LINK FT
You can access the FollowMyHealth Patient Portal offered by [facility] by registering at the following website: [email or web address]. By joining [employer]’s FollowMyHealth portal, you will also be able to view your health information using other applications (apps) compatible with our system.

## 2020-09-25 NOTE — DISCHARGE NOTE PROVIDER - CARE PROVIDERS DIRECT ADDRESSES
[email or web address]

## 2020-09-25 NOTE — PROGRESS NOTE ADULT - SUBJECTIVE AND OBJECTIVE BOX
LIYAH ZENDEJAS 75y Female  MRN#: [id number]   Hospital Day: 2d    SUBJECTIVE  Patient is a 75y old Female who presents with a chief complaint of Abdominal Pain (24 Sep 2020 11:10)  Currently admitted to medicine with the primary diagnosis of Rectal bleed      INTERVAL HPI AND OVERNIGHT EVENTS:  Patient was examined and seen at bedside. This morning she is resting comfortably in bed. O/n pt had frequent BMs due to colonoscopy prep. Pt c/o increased swelling of legs BL    REVIEW OF SYMPTOMS:  Negative unless o/w stated    OBJECTIVE  PAST MEDICAL & SURGICAL HISTORY  Hyperlipidemia, unspecified hyperlipidemia type    Hypothyroidism, unspecified type    Type 2 diabetes mellitus without complication, without long-term current use of insulin    Essential hypertension      ALLERGIES:  No Known Allergies    MEDICATIONS:  STANDING MEDICATIONS  amLODIPine   Tablet 10 milliGRAM(s) Oral daily  aspirin  chewable 81 milliGRAM(s) Oral daily  chlorhexidine 4% Liquid 1 Application(s) Topical daily  ciprofloxacin   IVPB      ciprofloxacin   IVPB 400 milliGRAM(s) IV Intermittent every 12 hours  dextrose 5%. 1000 milliLiter(s) IV Continuous <Continuous>  dextrose 50% Injectable 12.5 Gram(s) IV Push once  dextrose 50% Injectable 25 Gram(s) IV Push once  dextrose 50% Injectable 25 Gram(s) IV Push once  enoxaparin Injectable 40 milliGRAM(s) SubCutaneous daily  furosemide    Tablet 20 milliGRAM(s) Oral daily  influenza   Vaccine 0.5 milliLiter(s) IntraMuscular once  insulin glargine Injectable (LANTUS) 25 Unit(s) SubCutaneous at bedtime  insulin lispro (HumaLOG) corrective regimen sliding scale   SubCutaneous three times a day before meals  insulin lispro Injectable (HumaLOG) 7 Unit(s) SubCutaneous three times a day with meals  levothyroxine 150 MICROGram(s) Oral daily  lisinopril 40 milliGRAM(s) Oral daily  metoprolol succinate  milliGRAM(s) Oral daily  metroNIDAZOLE  IVPB 500 milliGRAM(s) IV Intermittent every 8 hours  pantoprazole    Tablet 40 milliGRAM(s) Oral before breakfast  simvastatin 10 milliGRAM(s) Oral at bedtime    PRN MEDICATIONS  dextrose 40% Gel 15 Gram(s) Oral once PRN  glucagon  Injectable 1 milliGRAM(s) IntraMuscular once PRN      VITAL SIGNS: Last 24 Hours  T(C): 36.1 (25 Sep 2020 05:45), Max: 36.7 (24 Sep 2020 13:30)  T(F): 97 (25 Sep 2020 05:45), Max: 98.1 (24 Sep 2020 13:30)  HR: 83 (25 Sep 2020 05:45) (68 - 83)  BP: 117/97 (25 Sep 2020 05:45) (117/97 - 123/67)  BP(mean): --  RR: 18 (25 Sep 2020 05:45) (18 - 18)  SpO2: 97% (24 Sep 2020 13:30) (97% - 97%)    LABS:                        12.2   12.12 )-----------( 168      ( 24 Sep 2020 22:57 )             37.4     09-24    130<L>  |  93<L>  |  30<H>  ----------------------------<  154<H>  3.9   |  25  |  1.4    Ca    9.5      24 Sep 2020 22:57  Phos  3.4     09-24  Mg     2.2     09-24    TPro  6.2  /  Alb  3.9  /  TBili  0.7  /  DBili  x   /  AST  25  /  ALT  34  /  AlkPhos  64  09-24    PT/INR - ( 24 Sep 2020 22:57 )   PT: 12.80 sec;   INR: 1.11 ratio         PTT - ( 24 Sep 2020 22:57 )  PTT:27.7 sec          GI PCR Panel, Stool (collected 24 Sep 2020 06:40)  Source: .Stool Feces  Final Report (24 Sep 2020 16:11):    GI PCR Results: NOT detected    *******Please Note:*******    GI panel PCR evaluates for:    Campylobacter, Plesiomonas shigelloides, Salmonella,    Vibrio, Yersinia enterocolitica, Enteroaggregative    Escherichia coli (EAEC), Enteropathogenic E.coli (EPEC),    Enterotoxigenic E. coli (ETEC) lt/st, Shiga-like    toxin-producing E. coli (STEC) stx1/stx2,    Shigella/ Enteroinvasive E. coli (EIEC), Cryptosporidium,    Cyclospora cayetanensis, Entamoeba histolytica,    Giardia lamblia, Adenovirus F 40/41, Astrovirus,    Norovirus GI/GII, Rotavirus A, Sapovirus    Culture - Blood (collected 23 Sep 2020 09:50)  Source: .Blood Blood-Peripheral  Preliminary Report (24 Sep 2020 23:02):    No growth to date.          RADIOLOGY:      PHYSICAL EXAM:  CONSTITUTIONAL: No acute distress, well-developed, well-groomed, AAOx3  HEAD: Atraumatic, normocephalic  EYES: EOM intact, conjunctiva and sclera clear  ENT: Supple, moist mucous membranes  PULMONARY: Clear to auscultation bilaterally; no wheezes, rales, or rhonchi  CARDIOVASCULAR: Regular rate and rhythm; no murmurs, rubs, or gallops  GASTROINTESTINAL: Soft, non-tender, non-distended.  MUSCULOSKELETAL: 2+ peripheral pulses; no clubbing, no cyanosis, +edema of lower legs BL up to knees  NEUROLOGY: non-focal  SKIN: No rashes or lesions; warm and dry, +pallor    ASSESSMENT & PLAN  # Rectal bleeding  -possibly due to hemorrhoids or colitis  # Transverse colitis  Etiologies include inflammatory vs infectious--had been concern for ischemic colitis but suspicion has since subsided  -Spoke to radiologist who said that CT Angio would not yield any added benefit and that vessels were patent on his read and that only not reported because they were not asked to look at vessels. Said that on previous CT with IV contrast, vessels appeared patent.  -Lac 1.5 in AM  -C. diff -ve  >c/w cipro+flagyl  >c/w clear liquid diet  >trend H/H (stable for now)  >Called by GI in AM, pt's Na too low and anesthesia refusing to proceed with case, redrew Na in AM, will f/u and contact GI.    # HTN  - Continue Amlodipine  - Continue Lisinopril 40 and hold Valsartan (Prescribed both)  - Continue Metoprolol and Lasix    # H/o gout  -c/w colchicine    # Hyperthyroidism s/p thyroidectomy  -c/w levothyroxine    # H/o CEA  -consider holding ASA in light of bleeding  -c/w statin    #DM  -on basal (25) /bolus (7/7/7) +SS  -f/u FS    #Misc:  DVT: Lovenox  GI: Pantoprazole  Diet: Full Liquids, NPO after MN  Dispo: Pending workup and resolution of symptoms.    PAST MEDICAL & SURGICAL HISTORY:  Hyperlipidemia, unspecified hyperlipidemia type    Hypothyroidism, unspecified type    Type 2 diabetes mellitus without complication, without long-term current use of insulin    Essential hypertension

## 2020-09-25 NOTE — DISCHARGE NOTE PROVIDER - HOSPITAL COURSE
75 year old Female with Past Medical History of NIDDM, HTN, HLD, hypothyroidism, gout, Chronic Constipation, hemorrhoids, bilateral carotid endarterectomy presents for abdominal pain and blood in the stools. She developed crampy 6-7/10 progressive lower abdominal pain, intermittent with urge to defecate relieved partially with defecation. Patient reports going to the washroom and had a watery bowel movement. Patients pain was partially relieved but an hour later had similar pain now associated with sweating, tenesmus and partially relieved with defecation. Patient says that on 3rd bowel movement she had a tablespoon of bright red blood with one to two clots mixed with the stools. She continued to have urge to defecate and bowel movements till the next morning for about 30 episodes. In the morning she continued to have the urge but says that some of the times she just had a table spoon of blood come out and no feces. At that point she decided to present to ED.  Of note patient reports a similar episode 10 days ago prior to presentation that resolved on its own. Also reports subjective fever last day when she graduated and found to be 99F and took aspirin for it.    Patient denies any food intake from outside, eating uncooked food, chest pain, heart issues, pain with meals, leg pain on ambulation, nausea, vomiting, diarrhea    In ED patient was hemodynamically stable, with elevated white count of 13.66 with lactate of 3.6. CT of Abdomen shows inflammation of the transverse and descending colon which could be inflammatory or ischemic in nature. Patient given 3.2L of fluids with improvement in lactate and Cipro+Flagyl. At time of interview patient denies any complaints and says that last she had a bowel movement more than 2 hours ago with resolution of pain and blood in stools. Patient was evaluated by the gastroenterology team, underwent colonoscopy: which shpwed erythema and colitis of descending colon and diverticulosis s/p biopsy. Patient tolerated the procedure well, hemoglobin remained stabled since admission, did not require any transfusions and is hemodynamically stable. Patient explained details of colonoscopy and advised to follow up with gastroenterology for biopsy results.

## 2020-09-25 NOTE — DISCHARGE NOTE PROVIDER - NSDCMRMEDTOKEN_GEN_ALL_CORE_FT
amLODIPine 10 mg oral tablet: 1 tab(s) orally once a day  aspirin 81 mg oral tablet: orally once a day  calcium carbonate 1000 mg oral tablet, chewable: orally 2 times a day  Cinnamon: 1000 milligram(s) orally 2 times a day  colchicine 0.6 mg oral tablet: 1 tab(s) orally once a day  furosemide 20 mg oral tablet: 1 tab(s) orally once a day  glipiZIDE 10 mg oral tablet: orally 2 times a day  levothyroxine 150 mcg (0.15 mg) oral tablet: 1 tab(s) orally once a day  lisinopril 40 mg oral tablet: orally 2 times a day  magnesium citrate: 400  orally 2 times a day  metFORMIN 500 mg oral tablet: orally once a day  metoprolol succinate 200 mg oral tablet, extended release: 1 tab(s) orally once a day  potassium chloride 10 mEq oral tablet, extended release: orally once a day  repaglinide 2 mg oral tablet:   rosuvastatin 5 mg oral tablet: 1 tab(s) orally once a day  valsartan: 100 milligram(s) orally once a day  Vitamin B-12: orally once a day  Vitamin D3 1000 intl units (25 mcg) oral tablet: orally 2 times a day  Zinc: 25  orally 2 times a day

## 2020-09-28 LAB
CULTURE RESULTS: SIGNIFICANT CHANGE UP
SPECIMEN SOURCE: SIGNIFICANT CHANGE UP
SURGICAL PATHOLOGY STUDY: SIGNIFICANT CHANGE UP

## 2020-09-29 PROBLEM — E78.5 HYPERLIPIDEMIA, UNSPECIFIED: Chronic | Status: ACTIVE | Noted: 2020-09-23

## 2020-09-29 PROBLEM — E03.9 HYPOTHYROIDISM, UNSPECIFIED: Chronic | Status: ACTIVE | Noted: 2020-09-23

## 2020-09-29 PROBLEM — I10 ESSENTIAL (PRIMARY) HYPERTENSION: Chronic | Status: ACTIVE | Noted: 2020-09-23

## 2020-09-29 PROBLEM — E11.9 TYPE 2 DIABETES MELLITUS WITHOUT COMPLICATIONS: Chronic | Status: ACTIVE | Noted: 2020-09-23

## 2020-09-30 DIAGNOSIS — E66.9 OBESITY, UNSPECIFIED: ICD-10-CM

## 2020-09-30 DIAGNOSIS — I10 ESSENTIAL (PRIMARY) HYPERTENSION: ICD-10-CM

## 2020-09-30 DIAGNOSIS — Z79.82 LONG TERM (CURRENT) USE OF ASPIRIN: ICD-10-CM

## 2020-09-30 DIAGNOSIS — K64.8 OTHER HEMORRHOIDS: ICD-10-CM

## 2020-09-30 DIAGNOSIS — E78.5 HYPERLIPIDEMIA, UNSPECIFIED: ICD-10-CM

## 2020-09-30 DIAGNOSIS — E55.9 VITAMIN D DEFICIENCY, UNSPECIFIED: ICD-10-CM

## 2020-09-30 DIAGNOSIS — M10.9 GOUT, UNSPECIFIED: ICD-10-CM

## 2020-09-30 DIAGNOSIS — E89.0 POSTPROCEDURAL HYPOTHYROIDISM: ICD-10-CM

## 2020-09-30 DIAGNOSIS — A09 INFECTIOUS GASTROENTERITIS AND COLITIS, UNSPECIFIED: ICD-10-CM

## 2020-09-30 DIAGNOSIS — D62 ACUTE POSTHEMORRHAGIC ANEMIA: ICD-10-CM

## 2020-09-30 DIAGNOSIS — K57.31 DIVERTICULOSIS OF LARGE INTESTINE WITHOUT PERFORATION OR ABSCESS WITH BLEEDING: ICD-10-CM

## 2020-09-30 DIAGNOSIS — Z79.84 LONG TERM (CURRENT) USE OF ORAL HYPOGLYCEMIC DRUGS: ICD-10-CM

## 2020-10-02 LAB — CALPROTECTIN STL-MCNT: 833 UG/G — HIGH (ref 0–120)

## 2020-10-06 ENCOUNTER — APPOINTMENT (OUTPATIENT)
Dept: GASTROENTEROLOGY | Facility: CLINIC | Age: 75
End: 2020-10-06

## 2021-01-12 ENCOUNTER — OUTPATIENT (OUTPATIENT)
Dept: OUTPATIENT SERVICES | Facility: HOSPITAL | Age: 76
LOS: 1 days | Discharge: HOME | End: 2021-01-12
Payer: MEDICARE

## 2021-01-12 DIAGNOSIS — I65.29 OCCLUSION AND STENOSIS OF UNSPECIFIED CAROTID ARTERY: ICD-10-CM

## 2021-01-12 PROCEDURE — 70498 CT ANGIOGRAPHY NECK: CPT | Mod: 26

## 2021-03-29 ENCOUNTER — OUTPATIENT (OUTPATIENT)
Dept: OUTPATIENT SERVICES | Facility: HOSPITAL | Age: 76
LOS: 1 days | Discharge: HOME | End: 2021-03-29
Payer: MEDICARE

## 2021-03-29 DIAGNOSIS — R06.02 SHORTNESS OF BREATH: ICD-10-CM

## 2021-03-29 DIAGNOSIS — R07.9 CHEST PAIN, UNSPECIFIED: ICD-10-CM

## 2021-03-29 PROCEDURE — 71250 CT THORAX DX C-: CPT | Mod: 26

## 2021-10-31 NOTE — CDI QUERY NOTE - NSCDIOTHERTXTBX_GEN_ALL_CORE_HH
____________________________________________________________________________________________________________________________________    75 F, rectal bleeding, Diagnosis:  Colitis  Clinical Indicators: D/C note: CT of Abdomen shows inflammation of the transverse and descending colon which could be inflammatory or ischemic in nature. Patient given 3.2L of fluids with improvement in lactate and Cipro+Flagyl.  Colonoscopy: intermediate colitis    Based on your professional judgment and clinical indicators, please further specify the diagnosis of Colitis.    [ ] Bacterial Colitis  [ ] Other   [ ] Unable to clinically determine    Thank you. 31-Oct-2021 23:24

## 2021-11-29 NOTE — ED PROVIDER NOTE - INTERNATIONAL TRAVEL
11/29/2021  3:59 PM    Care Management Initial Evaluation:    CM reviewed EMR and spoke to patient over the phone to complete the initial evaluation. Patient is on isolation precautions. Confirmed charted demographics. Reason for Admission:  COIVD 23  PMH CAD and DM                     RUR Score:  8%                   Plan for utilizing home health:    No home health history      PCP: First and Last name:  Gisel, Not On File, ESTEFANIA Bell     Name of Practice:    Are you a current patient: Yes/No: Yes   Approximate date of last visit: within the last year   Can you participate in a virtual visit with your PCP:                     Current Advanced Directive/Advance Care Plan: Full Code      Healthcare Decision Maker:   Click here to complete [id number] including selection of the Healthcare Decision Maker Relationship (ie \"Primary\")           Renetta Tang: - [phone]                  Transition of Care Plan:         Home: Patient resides in [facility] apartment with an elevator  DME: None  PTA: Independent with all ADL's    1). Patient admitted for medical management  2). Patient is currently on 4L of O2  3). CM following for dc needs    SHITAL Martinez  Care Manager        Care Management Interventions  PCP Verified by CM: Yes (within the last year)  Mode of Transport at Discharge:  Other (see comment)  Transition of Care Consult (CM Consult): Discharge Planning  Discharge Durable Medical Equipment: No  Physical Therapy Consult: No  Occupational Therapy Consult: No  Speech Therapy Consult: No  Support Systems: Parent(s)  Confirm Follow Up Transport: Family  Discharge Location  Discharge Placement: Home with family assistance          ' No

## 2022-12-09 NOTE — ED ADULT TRIAGE NOTE - PATIENT ON (OXYGEN DELIVERY METHOD)
2022     Kerline Goncalves (:  1972) is a 48 MD   bisoprolol-hydroCHLOROthiazide [facility]) 10-6.25 MG per tablet Take 1 tablet by mouth daily Yes Lorena Rico MD   busPIRone (BUSPAR) 5 MG tablet TAKE 1 TABLET BY MOUTH TWICE DAILY AS NEEDED FOR NERVES Yes Noam Villarreal MD   vitamin B-12 (CYANOCOBALAMIN) 100 MCG tablet Take 1 tablet by mouth daily Yes Noam Villarreal MD   Continuous Blood Gluc  (FREESTYLE NILO 14 DAY READER) LAURO USE TO [facility] Yes Noam Villarreal MD   atorvastatin (LIPITOR) 40 MG tablet Take 1 tablet by mouth in the morning.  Yes Noam Villarreal MD   Continuous Blood Gluc Sensor (FREESTYLE NILO 14 DAY SENSOR) [facility] room air

## 2023-01-05 ENCOUNTER — OUTPATIENT (OUTPATIENT)
Dept: OUTPATIENT SERVICES | Facility: HOSPITAL | Age: 78
LOS: 1 days | Discharge: HOME | End: 2023-01-05
Payer: MEDICARE

## 2023-01-05 DIAGNOSIS — Z12.31 ENCOUNTER FOR SCREENING MAMMOGRAM FOR MALIGNANT NEOPLASM OF BREAST: ICD-10-CM

## 2023-01-05 PROCEDURE — 77063 BREAST TOMOSYNTHESIS BI: CPT | Mod: 26

## 2023-01-05 PROCEDURE — 77067 SCR MAMMO BI INCL CAD: CPT | Mod: 26

## 2023-01-18 ENCOUNTER — OUTPATIENT (OUTPATIENT)
Dept: OUTPATIENT SERVICES | Facility: HOSPITAL | Age: 78
LOS: 1 days | Discharge: HOME | End: 2023-01-18
Payer: MEDICARE

## 2023-01-18 DIAGNOSIS — R91.1 SOLITARY PULMONARY NODULE: ICD-10-CM

## 2023-01-18 PROCEDURE — 71271 CT THORAX LUNG CANCER SCR C-: CPT | Mod: 26

## 2023-06-07 PROBLEM — Z00.00 ENCOUNTER FOR PREVENTIVE HEALTH EXAMINATION: Noted: 2023-06-07

## 2023-06-21 ENCOUNTER — APPOINTMENT (OUTPATIENT)
Dept: NUTRITION | Facility: CLINIC | Age: 78
End: 2023-06-21

## 2023-06-21 ENCOUNTER — OUTPATIENT (OUTPATIENT)
Dept: OUTPATIENT SERVICES | Facility: HOSPITAL | Age: 78
LOS: 1 days | End: 2023-06-21
Payer: MEDICARE

## 2023-06-21 DIAGNOSIS — Z00.00 ENCOUNTER FOR GENERAL ADULT MEDICAL EXAMINATION WITHOUT ABNORMAL FINDINGS: ICD-10-CM

## 2023-06-21 PROCEDURE — G0108: CPT

## 2023-06-22 ENCOUNTER — APPOINTMENT (OUTPATIENT)
Dept: NUTRITION | Facility: CLINIC | Age: 78
End: 2023-06-22

## 2023-06-23 DIAGNOSIS — E11.9 TYPE 2 DIABETES MELLITUS WITHOUT COMPLICATIONS: ICD-10-CM

## 2023-09-22 NOTE — ASU PREOP CHECKLIST - BOWEL PREP
done Niacinamide Counseling: I recommended taking niacin or niacinamide, also know as vitamin B3, twice daily. Recent evidence suggests that taking vitamin B3 (500 mg twice daily) can reduce the risk of actinic keratoses and non-melanoma skin cancers. Side effects of vitamin B3 include flushing and headache.

## 2023-11-15 ENCOUNTER — OUTPATIENT (OUTPATIENT)
Dept: OUTPATIENT SERVICES | Facility: HOSPITAL | Age: 78
LOS: 1 days | Discharge: HOME | End: 2023-11-15
Payer: MEDICARE

## 2023-11-15 VITALS — RESPIRATION RATE: 17 BRPM | DIASTOLIC BLOOD PRESSURE: 73 MMHG | SYSTOLIC BLOOD PRESSURE: 187 MMHG | HEART RATE: 75 BPM

## 2023-11-15 VITALS
WEIGHT: 205.91 LBS | SYSTOLIC BLOOD PRESSURE: 175 MMHG | OXYGEN SATURATION: 94 % | HEIGHT: 65 IN | TEMPERATURE: 97 F | HEART RATE: 74 BPM | DIASTOLIC BLOOD PRESSURE: 81 MMHG | RESPIRATION RATE: 17 BRPM

## 2023-11-15 DIAGNOSIS — H25.12 AGE-RELATED NUCLEAR CATARACT, LEFT EYE: ICD-10-CM

## 2023-11-15 DIAGNOSIS — Z98.890 OTHER SPECIFIED POSTPROCEDURAL STATES: Chronic | ICD-10-CM

## 2023-11-15 LAB
GLUCOSE BLDC GLUCOMTR-MCNC: 153 MG/DL — HIGH (ref 70–99)
GLUCOSE BLDC GLUCOMTR-MCNC: 153 MG/DL — HIGH (ref 70–99)

## 2023-11-15 PROCEDURE — 82962 GLUCOSE BLOOD TEST: CPT

## 2023-11-15 PROCEDURE — V2632: CPT

## 2023-11-15 RX ORDER — METFORMIN HYDROCHLORIDE 850 MG/1
0 TABLET ORAL
Qty: 0 | Refills: 0 | DISCHARGE

## 2023-11-15 RX ORDER — CINNAMON BARK 500 MG
1000 CAPSULE ORAL
Qty: 0 | Refills: 0 | DISCHARGE

## 2023-11-15 RX ORDER — REPAGLINIDE 1 MG/1
0 TABLET ORAL
Qty: 0 | Refills: 0 | DISCHARGE

## 2023-11-15 NOTE — ASU PATIENT PROFILE, ADULT - NSICDXPASTMEDICALHX_GEN_ALL_CORE_FT
PAST MEDICAL HISTORY:  Cataract     Essential hypertension     H/O: gout     [place] (hard of hearing)     Hyperlipidemia, unspecified hyperlipidemia type     Hypothyroidism, unspecified type     Type 2 diabetes mellitus without complication, without long-term current use of insulin

## 2023-11-15 NOTE — ASU PREOP CHECKLIST - ASSESSMENT, HISTORY & PHYSICAL COMPLETED AND ON MEDICAL RECORD
Did he have the vaccines or not ? Should I discontinue???  
He had the vaccines, pls discontinue  
done

## 2023-11-21 DIAGNOSIS — Z79.84 LONG TERM (CURRENT) USE OF ORAL HYPOGLYCEMIC DRUGS: ICD-10-CM

## 2023-11-21 DIAGNOSIS — H25.12 AGE-RELATED NUCLEAR CATARACT, LEFT EYE: ICD-10-CM

## 2023-11-21 DIAGNOSIS — Z79.82 LONG TERM (CURRENT) USE OF ASPIRIN: ICD-10-CM

## 2023-11-21 DIAGNOSIS — Z79.4 LONG TERM (CURRENT) USE OF INSULIN: ICD-10-CM

## 2023-11-21 DIAGNOSIS — I10 ESSENTIAL (PRIMARY) HYPERTENSION: ICD-10-CM

## 2023-11-21 DIAGNOSIS — E11.36 TYPE 2 DIABETES MELLITUS WITH DIABETIC CATARACT: ICD-10-CM

## 2023-11-21 DIAGNOSIS — Z79.85 LONG-TERM (CURRENT) USE OF INJECTABLE NON-INSULIN ANTIDIABETIC DRUGS: ICD-10-CM

## 2023-11-21 DIAGNOSIS — H21.562 PUPILLARY ABNORMALITY, LEFT EYE: ICD-10-CM

## 2023-12-19 PROBLEM — H26.9 UNSPECIFIED CATARACT: Chronic | Status: ACTIVE | Noted: 2023-11-15

## 2023-12-19 PROBLEM — Z87.39 PERSONAL HISTORY OF OTHER DISEASES OF THE MUSCULOSKELETAL SYSTEM AND CONNECTIVE TISSUE: Chronic | Status: ACTIVE | Noted: 2023-11-15

## 2023-12-19 PROBLEM — H91.90 UNSPECIFIED HEARING LOSS, UNSPECIFIED EAR: Chronic | Status: ACTIVE | Noted: 2023-11-15

## 2023-12-27 ENCOUNTER — OUTPATIENT (OUTPATIENT)
Dept: OUTPATIENT SERVICES | Facility: HOSPITAL | Age: 78
LOS: 1 days | Discharge: HOME | End: 2023-12-27
Payer: MEDICARE

## 2023-12-27 VITALS — DIASTOLIC BLOOD PRESSURE: 75 MMHG | RESPIRATION RATE: 15 BRPM | SYSTOLIC BLOOD PRESSURE: 144 MMHG | HEART RATE: 67 BPM

## 2023-12-27 VITALS
WEIGHT: 205.91 LBS | SYSTOLIC BLOOD PRESSURE: 153 MMHG | OXYGEN SATURATION: 93 % | HEIGHT: 65 IN | RESPIRATION RATE: 17 BRPM | DIASTOLIC BLOOD PRESSURE: 72 MMHG | TEMPERATURE: 97 F | HEART RATE: 72 BPM

## 2023-12-27 DIAGNOSIS — H21.561 PUPILLARY ABNORMALITY, RIGHT EYE: ICD-10-CM

## 2023-12-27 DIAGNOSIS — H25.811 COMBINED FORMS OF AGE-RELATED CATARACT, RIGHT EYE: ICD-10-CM

## 2023-12-27 DIAGNOSIS — H26.9 UNSPECIFIED CATARACT: ICD-10-CM

## 2023-12-27 DIAGNOSIS — E03.9 HYPOTHYROIDISM, UNSPECIFIED: ICD-10-CM

## 2023-12-27 DIAGNOSIS — Z98.42 CATARACT EXTRACTION STATUS, LEFT EYE: ICD-10-CM

## 2023-12-27 DIAGNOSIS — Z98.42 CATARACT EXTRACTION STATUS, LEFT EYE: Chronic | ICD-10-CM

## 2023-12-27 DIAGNOSIS — Z79.4 LONG TERM (CURRENT) USE OF INSULIN: ICD-10-CM

## 2023-12-27 DIAGNOSIS — Z96.1 PRESENCE OF INTRAOCULAR LENS: ICD-10-CM

## 2023-12-27 DIAGNOSIS — E11.36 TYPE 2 DIABETES MELLITUS WITH DIABETIC CATARACT: ICD-10-CM

## 2023-12-27 DIAGNOSIS — Z79.82 LONG TERM (CURRENT) USE OF ASPIRIN: ICD-10-CM

## 2023-12-27 DIAGNOSIS — E78.5 HYPERLIPIDEMIA, UNSPECIFIED: ICD-10-CM

## 2023-12-27 DIAGNOSIS — Z98.890 OTHER SPECIFIED POSTPROCEDURAL STATES: Chronic | ICD-10-CM

## 2023-12-27 DIAGNOSIS — M10.9 GOUT, UNSPECIFIED: ICD-10-CM

## 2023-12-27 LAB
GLUCOSE BLDC GLUCOMTR-MCNC: 196 MG/DL — HIGH (ref 70–99)
GLUCOSE BLDC GLUCOMTR-MCNC: 196 MG/DL — HIGH (ref 70–99)

## 2023-12-27 PROCEDURE — 82962 GLUCOSE BLOOD TEST: CPT

## 2023-12-27 PROCEDURE — V2632: CPT

## 2023-12-27 RX ORDER — ASPIRIN/CALCIUM CARB/MAGNESIUM 324 MG
0 TABLET ORAL
Qty: 0 | Refills: 0 | DISCHARGE

## 2023-12-27 RX ORDER — LEVOTHYROXINE SODIUM 125 MCG
1 TABLET ORAL
Qty: 0 | Refills: 0 | DISCHARGE

## 2023-12-27 RX ORDER — ROSUVASTATIN CALCIUM 5 MG/1
1 TABLET ORAL
Qty: 0 | Refills: 0 | DISCHARGE

## 2023-12-27 RX ORDER — CHOLECALCIFEROL (VITAMIN D3) 125 MCG
0 CAPSULE ORAL
Qty: 0 | Refills: 0 | DISCHARGE

## 2023-12-27 RX ORDER — SEMAGLUTIDE 0.68 MG/ML
0.5 INJECTION, SOLUTION SUBCUTANEOUS
Refills: 0 | DISCHARGE

## 2023-12-27 RX ORDER — FUROSEMIDE 40 MG
1 TABLET ORAL
Refills: 0 | DISCHARGE

## 2023-12-27 RX ORDER — VALSARTAN 80 MG/1
100 TABLET ORAL
Qty: 0 | Refills: 0 | DISCHARGE

## 2023-12-27 RX ORDER — INSULIN GLARGINE 100 [IU]/ML
0 INJECTION, SOLUTION SUBCUTANEOUS
Refills: 0 | DISCHARGE

## 2023-12-27 RX ORDER — GLIMEPIRIDE 1 MG
1 TABLET ORAL
Refills: 0 | DISCHARGE

## 2023-12-27 RX ORDER — ZINC SULFATE TAB 220 MG (50 MG ZINC EQUIVALENT) 220 (50 ZN) MG
25 TAB ORAL
Qty: 0 | Refills: 0 | DISCHARGE

## 2023-12-27 RX ORDER — LEVOTHYROXINE SODIUM 125 MCG
1 TABLET ORAL
Refills: 0 | DISCHARGE

## 2023-12-27 RX ORDER — CALCIUM CARBONATE 500(1250)
0 TABLET ORAL
Qty: 0 | Refills: 0 | DISCHARGE

## 2023-12-27 RX ORDER — MULTIVIT WITH MIN/MFOLATE/K2 340-15/3 G
400 POWDER (GRAM) ORAL
Qty: 0 | Refills: 0 | DISCHARGE

## 2023-12-27 RX ORDER — COLCHICINE 0.6 MG
1 TABLET ORAL
Qty: 0 | Refills: 0 | DISCHARGE

## 2023-12-27 RX ORDER — POTASSIUM CHLORIDE 20 MEQ
0 PACKET (EA) ORAL
Qty: 0 | Refills: 0 | DISCHARGE

## 2023-12-27 RX ORDER — AMLODIPINE BESYLATE 2.5 MG/1
1 TABLET ORAL
Qty: 0 | Refills: 0 | DISCHARGE

## 2023-12-27 RX ORDER — PREGABALIN 225 MG/1
0 CAPSULE ORAL
Qty: 0 | Refills: 0 | DISCHARGE

## 2023-12-27 RX ORDER — METOPROLOL TARTRATE 50 MG
1 TABLET ORAL
Qty: 0 | Refills: 0 | DISCHARGE

## 2023-12-27 RX ORDER — FUROSEMIDE 40 MG
1 TABLET ORAL
Qty: 0 | Refills: 0 | DISCHARGE

## 2023-12-27 NOTE — ASU PATIENT PROFILE, ADULT - FALL HARM RISK - HARM RISK INTERVENTIONS
Communicate Risk of Fall with Harm to all staff/Reinforce activity limits and safety measures with patient and family/Tailored Fall Risk Interventions/Visual Cue: Yellow wristband and red socks/Bed in lowest position, wheels locked, appropriate side rails in place/Call bell, personal items and telephone in reach/Instruct patient to call for assistance before getting out of bed or chair/Non-slip footwear when patient is out of bed/[place] to call system/Physically safe environment - no spills, clutter or unnecessary equipment/Purposeful Proactive Rounding/Room/bathroom lighting operational, light cord in reach Communicate Risk of Fall with Harm to all staff/Reinforce activity limits and safety measures with patient and family/Tailored Fall Risk Interventions/Visual Cue: Yellow wristband and red socks/Bed in lowest position, wheels locked, appropriate side rails in place/Call bell, personal items and telephone in reach/Instruct patient to call for assistance before getting out of bed or chair/Non-slip footwear when patient is out of bed/[place] to call system/Physically safe environment - no spills, clutter or unnecessary equipment/Purposeful Proactive Rounding/Room/bathroom lighting operational, light cord in reach

## 2023-12-27 NOTE — PRE-ANESTHESIA EVALUATION ADULT - NSANTHOSAYNRD_GEN_A_CORE
No. WINDY screening performed.  STOP BANG Legend: 0-2 = LOW Risk; 3-4 = INTERMEDIATE Risk; 5-8 = HIGH Risk show

## 2023-12-27 NOTE — ASU DISCHARGE PLAN (ADULT/PEDIATRIC) - NS MD DC FALL RISK RISK
For information on Fall & Injury Prevention, visit: [email or web address] OR  [email or web address] OR  https://www.cdc.gov/steadi/patient.html For information on Fall & Injury Prevention, visit: [email or web address] OR  [email or web address] OR  https://www.cdc.gov/steadi/patient.html

## 2023-12-27 NOTE — ASU PATIENT PROFILE, ADULT - NSICDXPASTSURGICALHX_GEN_ALL_CORE_FT
PAST SURGICAL HISTORY:  H/O carotid endarterectomy     Status post cataract extraction and insertion of intraocular lens of left eye

## 2023-12-27 NOTE — ASU PATIENT PROFILE, ADULT - NSICDXPASTMEDICALHX_GEN_ALL_CORE_FT
PAST MEDICAL HISTORY:  Cataract     Essential hypertension     H/O: gout     [place] (hard of hearing)     Hyperlipidemia, unspecified hyperlipidemia type     Hypothyroidism, unspecified type     Type 2 diabetes mellitus without complication, without long-term current use of insulin      PAST MEDICAL HISTORY:  Cataract     Essential hypertension     H/O: gout     [place] (hard of hearing)     Hyperlipidemia, unspecified hyperlipidemia type     Hypothyroidism, unspecified type     Type 2 diabetes mellitus without complication, without long-term current use of insulin

## 2024-01-24 NOTE — PATIENT PROFILE ADULT - NSPROHMDIABETMGMTSTRAT_GEN_A_NUR
For information on Fall & Injury Prevention, visit: [email or web address] OR  [email or web address] OR  https://www.cdc.gov/steadi/patient.html
blood glucose testing

## 2024-01-25 ENCOUNTER — OUTPATIENT (OUTPATIENT)
Dept: OUTPATIENT SERVICES | Facility: HOSPITAL | Age: 79
LOS: 1 days | End: 2024-01-25
Payer: MEDICARE

## 2024-01-25 DIAGNOSIS — R05.9 COUGH, UNSPECIFIED: ICD-10-CM

## 2024-01-25 DIAGNOSIS — Z00.8 ENCOUNTER FOR OTHER GENERAL EXAMINATION: ICD-10-CM

## 2024-01-25 DIAGNOSIS — Z98.42 CATARACT EXTRACTION STATUS, LEFT EYE: Chronic | ICD-10-CM

## 2024-01-25 DIAGNOSIS — Z98.890 OTHER SPECIFIED POSTPROCEDURAL STATES: Chronic | ICD-10-CM

## 2024-01-25 PROCEDURE — 71250 CT THORAX DX C-: CPT | Mod: 26

## 2024-01-25 PROCEDURE — 71250 CT THORAX DX C-: CPT

## 2024-01-26 DIAGNOSIS — R05.9 COUGH, UNSPECIFIED: ICD-10-CM

## 2024-09-25 NOTE — ASU PATIENT PROFILE, ADULT - TEACHING/LEARNING LEARNING PREFERENCES
Problem: Adult Inpatient Plan of Care  Goal: Plan of Care Review  Outcome: Progressing  Goal: Patient-Specific Goal (Individualized)  Outcome: Progressing  Goal: Absence of Hospital-Acquired Illness or Injury  Outcome: Progressing  Goal: Optimal Comfort and Wellbeing  Outcome: Progressing  Goal: Readiness for Transition of Care  Outcome: Progressing     Problem: Diabetes Comorbidity  Goal: Blood Glucose Level Within Targeted Range  Outcome: Progressing     Problem: Infection  Goal: Absence of Infection Signs and Symptoms  Outcome: Progressing     Problem: Wound  Goal: Optimal Coping  Outcome: Progressing  Goal: Optimal Functional Ability  Outcome: Progressing  Goal: Absence of Infection Signs and Symptoms  Outcome: Progressing  Goal: Improved Oral Intake  Outcome: Progressing  Goal: Optimal Pain Control and Function  Outcome: Progressing  Goal: Skin Health and Integrity  Outcome: Progressing  Goal: Optimal Wound Healing  Outcome: Progressing      verbal instruction

## 2024-09-27 ENCOUNTER — OUTPATIENT (OUTPATIENT)
Dept: OUTPATIENT SERVICES | Facility: HOSPITAL | Age: 79
LOS: 1 days | End: 2024-09-27
Payer: MEDICARE

## 2024-09-27 DIAGNOSIS — Z98.890 OTHER SPECIFIED POSTPROCEDURAL STATES: Chronic | ICD-10-CM

## 2024-09-27 DIAGNOSIS — R91.1 SOLITARY PULMONARY NODULE: ICD-10-CM

## 2024-09-27 DIAGNOSIS — Z98.42 CATARACT EXTRACTION STATUS, LEFT EYE: Chronic | ICD-10-CM

## 2024-09-27 DIAGNOSIS — Z00.8 ENCOUNTER FOR OTHER GENERAL EXAMINATION: ICD-10-CM

## 2024-09-27 PROCEDURE — 71250 CT THORAX DX C-: CPT | Mod: 26

## 2024-09-27 PROCEDURE — 71250 CT THORAX DX C-: CPT

## 2024-09-28 DIAGNOSIS — R91.1 SOLITARY PULMONARY NODULE: ICD-10-CM

## 2024-11-26 ENCOUNTER — NON-APPOINTMENT (OUTPATIENT)
Age: 79
End: 2024-11-26

## 2024-12-09 NOTE — ED ADULT NURSE NOTE - NSHOSCREENINGQ1_ED_ALL_ED
Subjective   Patient: Jose C Glover  MRN: [id number]   : 1969     REASON FOR VISIT:      HISTORY OF PRESENT ILLNESS:  Jose C Glover is a 55 year old male last visit 10/17/24. Has history of CAD, stents, pericarditis, HLD,    Went to ED with CP 24 , work up revealed NSTEMI  Underwent staged PCI of LAD and RCI. Discharged on Asa and brilinta    Feels well at this time.       Past Medical History:   Diagnosis Date    Abdominal wall pain in left upper quadrant     Abnormal US (ultrasound) of abdomen     Anal fissure     Atherosclerotic cardiovascular disease     CAD (coronary artery disease)     Chest pain     Constipation     Costochondritis     Cyst of epididymis     Diabetes mellitus  (CMD)     Elevated PSA     Elimination disorder with fecal symptoms     Enlarged prostate with lower urinary tract symptoms (LUTS)     External hemorrhoids     Headache     Hematochezia     Herpes, genital     Hyperlipidemia     IBS (irritable bowel syndrome)     Internal hemorrhoids     Myocardial infarct  (CMD)     Odynophagia     Pericarditis (CMD)     Portal vein aneurysm     Rectal bleed     Thrombocytopenia (CMD)     Urinary frequency     Weight loss        Past Surgical History:   Procedure Laterality Date    Colonoscopy  2022    at [facility] by dr. estrada, prior 2016    Esophagogastroduodenoscopy transoral flex w/bx single or mult      Stent implant      Heart x 5       Social History     Socioeconomic History    Marital status: /Civil Union     Spouse name: Elio    Number of children: 3    Years of education: Not on file    Highest education level: Master's degree (e.g., MA, MS, Johnathon, MEd, MSW, CHAO)   Occupational History    Occupation: [employer]   Tobacco Use    Smoking status: Never    Smokeless tobacco: Never   Vaping Use    Vaping status: never used   Substance and Sexual Activity    Alcohol use: No    Drug use: Never    Sexual activity: Not on file   Other Topics Concern    Not on file    Social History Narrative    Not on file     Social Determinants of Health     Financial Resource Strain: Low Risk  (11/19/2024)    Financial Resource Strain     Unable to Get: None   Recent Concern: Financial Resource Strain - Medium Risk (9/20/2024)    Financial Resource Strain     Unable to Get: Medicine or Any Health Care (Medical, Dental, Mental Health, Vision)   Food Insecurity: Low Risk  (11/18/2024)    Food Insecurity     Worried about Food: Never true     Food is Gone: Never true   Transportation Needs: Not At Risk (11/18/2024)    Transportation Needs     Lack of Reliable Transportation: No   Physical Activity: High Risk (9/30/2024)    Exercise Vital Sign     Days of Exercise per Week: 0 days     Minutes of Exercise per Session: 0 min   Stress: Not on file   Social Connections: Low Risk  (11/18/2024)    Social Connections     Social Connectivity: 5 or more times a week   Interpersonal Safety: Low Risk  (11/18/2024)    Interpersonal Safety     How often physically hurt: Never     How often insulted or talked down to: Never     How often threatened with harm: Never     How often scream or curse at: Never       Family History   Problem Relation Age of Onset    Diabetes Mother     Dementia/Alzheimers Mother     Diabetes Father     Patient is unaware of any medical problems Sister     Diabetes Brother        Current Outpatient Medications   Medication Sig Dispense Refill    metoPROLOL succinate (TOPROL-XL) 50 MG 24 hr tablet Take 1 tablet by mouth in the morning and 1 tablet in the evening. 180 tablet 3    Blood Glucose Monitoring Suppl w/Device Kit Use to check blood sugar 2-3 times daily. Please dispense glucometer covered under patient's insurance. 1 kit 1    insulin degludec (Tresiba FlexTouch) 100 UNIT/ML pen-injector Inject 10 Units into the skin daily. Prime 2 units before each dose. 15 mL 12    Alcohol Swabs Pads Use to clean skin before injection/fingerstick. 100 each 11    Insulin Pen Needle 32G X 4  MM Misc Use to inject insulin  times daily. 100 each 11    metFORMIN (GLUCOPHAGE-XR) 500 MG 24 hr tablet Take 2 tablets by mouth in the morning and 2 tablets in the evening. 360 tablet 3    aspirin (ECOTRIN) 81 MG EC tablet Take 1 tablet by mouth daily. 30 tablet 11    ticagrelor (BRILINTA) 90 MG Tab Take 1 tablet by mouth in the morning and 1 tablet in the evening. 60 tablet 11    Linzess 290 MCG TAKE 1 CAPSULE BY MOUTH EVERY DAY 90 capsule 1    Accu-Chek Guide test strip TEST BLOOD SUGAR 2-3 TIMES DAILY AS DIRECTED 300 strip 7    rosuvastatin (CRESTOR) 40 MG tablet Take 1 tablet by mouth daily. 90 tablet 3    Semaglutide 7 MG Tab Take 1 tablet by mouth daily (before breakfast). Indications: Type 2 Diabetes 90 tablet 1    famotidine (PEPCID) 40 MG tablet Take 1 tablet by mouth nightly. 90 tablet 3    nitroGLYCERIN (NITROSTAT) 0.4 MG sublingual tablet Place 1 tablet under the tongue every 5 minutes as needed for Chest pain. 90 tablet 3    Alcohol Swabs Pads Use to clean area before insulin injection 200 each 3    Lancets Thin Misc Use to test blood sugar 2-3 times per day. Please dispense lancets covered by patient's insurance 200 each 11    acetaminophen (TYLENOL) 325 MG tablet Take 650 mg by mouth every 4 hours as needed.       No current facility-administered medications for this visit.       ALLERGIES:  No Known Allergies    Review of Systems   Constitutional: Negative for chills, diaphoresis, fever, malaise/fatigue, weight gain and weight loss.   HENT:  Negative for congestion, nosebleeds, sore throat, stridor and tinnitus.    Eyes:  Negative for blurred vision, double vision, vision loss in left eye, vision loss in right eye and visual disturbance.   Cardiovascular:  Negative for chest pain, claudication, cyanosis, dyspnea on exertion, irregular heartbeat, leg swelling, near-syncope, orthopnea, palpitations, paroxysmal nocturnal dyspnea and syncope.   Respiratory:  Negative for cough, hemoptysis, shortness of  breath, sleep disturbances due to breathing, snoring, sputum production and wheezing.    Endocrine: Negative for cold intolerance, heat intolerance, polydipsia, polyphagia and polyuria.   Hematologic/Lymphatic: Negative for bleeding problem. Does not bruise/bleed easily.   Skin: Negative.  Negative for color change, flushing, itching and rash.   Musculoskeletal:  Negative for arthritis, back pain, falls, muscle weakness and myalgias.        MSK chest pain, reproducible left upper chest wall   Gastrointestinal:  Negative for bloating, abdominal pain, anorexia, change in bowel habit, dysphagia, heartburn, hematemesis, hematochezia, melena and nausea.   Genitourinary:  Negative for bladder incontinence, decreased libido, dysuria, flank pain, frequency and hematuria.   Neurological:  Negative for brief paralysis, disturbances in coordination, excessive daytime sleepiness, dizziness, focal weakness, headaches, light-headedness, loss of balance, numbness, seizures, sensory change, tremors, vertigo and weakness.   Psychiatric/Behavioral:  Negative for altered mental status, depression and memory loss. The patient does not have insomnia and is not nervous/anxious.    Allergic/Immunologic: Negative for environmental allergies, hives and persistent infections.       Objective     Visit Vitals  Pulse 88   Ht 5' 9\" (1.753 m)   Wt 85.1 kg (187 lb 11.2 oz)   BMI 27.72 kg/m²         Physical Exam  Vitals and nursing note reviewed.   Constitutional:       General: He is not in acute distress.     Appearance: Normal appearance. He is well-developed. He is not ill-appearing or diaphoretic.   HENT:      Head: Normocephalic and atraumatic.      Neck: Normal range of motion and neck supple. No tenderness.   Eyes:      Conjunctiva/sclera: Conjunctivae normal.   Neck:      Thyroid: No thyromegaly.      Vascular: Normal carotid pulses. No carotid bruit, hepatojugular reflux or JVD.      Trachea: No tracheal deviation.   Cardiovascular:       Rate and Rhythm: Normal rate and regular rhythm.      Chest Wall: PMI is not displaced. No thrill.      Pulses: Normal pulses.           Carotid pulses are 2+ on the right side and 2+ on the left side.       Radial pulses are 2+ on the right side and 2+ on the left side.        Dorsalis pedis pulses are 2+ on the right side and 2+ on the left side.        Posterior tibial pulses are 2+ on the right side and 2+ on the left side.      Heart sounds: Normal heart sounds, S1 normal and S2 normal. No murmur heard.     No friction rub. No gallop. No S3 or S4 sounds.      Comments: Right radial cath site healed well  Pulmonary:      Effort: Pulmonary effort is normal. No respiratory distress.      Breath sounds: Normal breath sounds. No stridor. No wheezing or rales.   Chest:      Chest wall: No tenderness.   Abdominal:      General: Bowel sounds are normal. There is no distension.      Palpations: Abdomen is soft. There is no mass.      Tenderness: There is no abdominal tenderness. There is no guarding or rebound.      Hernia: No hernia is present.   Musculoskeletal:         General: No tenderness or deformity. Normal range of motion.      Right lower leg: No edema.      Left lower leg: No edema.   Lymphadenopathy:      Cervical: No cervical adenopathy.   Skin:     General: Skin is warm and dry.      Capillary Refill: Capillary refill takes less than 2 seconds.      Coloration: Skin is not pale.      Findings: No erythema or rash.   Neurological:      Mental Status: He is alert and oriented to person, place, and time. Mental status is at baseline.      Motor: No abnormal muscle tone.   Psychiatric:         Behavior: Behavior normal.         Thought Content: Thought content normal.         Judgment: Judgment normal.       Labs reviewed:elevated troponin, normal hempglobin,    Latest Reference Range & Units 11/26/24 11:40   WBC 4.2 - 11.0 K/mcL 5.1   RBC 4.50 - 5.90 mil/mcL 5.02   HGB 13.0 - 17.0 g/dL 15.0   HCT 39.0 - 51.0  % 45.6   MCV 78.0 - 100.0 fl 90.8   MCH 26.0 - 34.0 pg 29.9   MCHC 32.0 - 36.5 g/dL 32.9   RDW-SD 39.0 - 50.0 fL 43.6   RDW-CV 11.0 - 15.0 % 13.1    - 450 K/mcL 186   NRBC <=0 /100 WBC 0   Neutrophil % 37   LYMPH % 52   MONO % 10   EOSIN % 1   BASO % 0   Absolute Neutrophil 1.8 - 7.7 K/mcL 1.9   Absolute Lymph 1.0 - 4.0 K/mcL 2.6   Absolute Mono 0.3 - 0.9 K/mcL 0.5   Absolute Eos 0.0 - 0.5 K/mcL 0.0   Absolute Baso 0.0 - 0.3 K/mcL 0.0   Immature Granulocytes % 0   Absolute Immature Granulocytes 0.0 - 0.2 K/mcL 0.0     Troponin level  719 High Panic  632 High Panic       Latest Reference Range & Units 11/19/24 05:40   Sodium 135 - 145 mmol/L 136   Potassium 3.4 - 5.1 mmol/L 4.2   Chloride 97 - 110 mmol/L 103   CO2 21 - 32 mmol/L 27   ANION GAP 7 - 19 mmol/L 10   Glucose 70 - 99 mg/dL 202 (H)   BUN 6 - 20 mg/dL 11   Creatinine 0.67 - 1.17 mg/dL 1.01   BUN/CREATININE RATIO 7 - 25  11   Glomerular Filtration Rate >=60  88   CALCIUM 8.4 - 10.2 mg/dL 8.8   TOTAL BILIRUBIN 0.2 - 1.0 mg/dL 0.4   AST/SGOT <=37 Units/L 44 (H)   ALT/SGPT <64 Units/L 29   ALK PHOSPHATASE 45 - 117 Units/L 46   Albumin 3.4 - 5.0 g/dL 3.0 (L)   GLOBULIN 2.0 - 4.0 g/dL 3.9   A/G Ratio, Serum 1.0 - 2.4  0.8 (L)   TOTAL PROTEIN 6.4 - 8.2 g/dL 6.9     Cath 11/19/24  FINDINGS:   Left main coronary artery: No significant lesions  Left anterior descending artery: Has widely patent proximal to mid LAD stent from yesterday's work.  There is distal LAD 50% stenosis.  Left Circumflex artery: Widely patent mid left circumflex stent.  OM 3 had 60% stenosis in its mid segment  Right coronary artery: Has proximal 80% stenosis           PLAN OF CARE:   It was the decided to proceed with the interventional part, Hence exchanged the Tiger catheter over the 0.35 J wire with JR4 guide catheter     Anticoagulation with IV heparin was administered per protocol and dosed per ACT findings, which were monitored throughout the case.      0.014 Prowater wire was used  to cross the proximal RCA lesion, then advanced a 2.5 x 15 mm baloon and predilated the lesion      Intravascular ultrasound/IVUS was used to interrogate the results and size the stent was decided based on that to proceed with stenting of the proximal RCA with a 3.5 x 33 mm Xience stent overlapping with the prior mid RCA stent  Then postdilated the proximal part of the stent utilizing 4.5 x 8 mm noncompliant balloon for the proximal part of the stent all the way to the ostium of the RCA     Follow up angiography showed excellent results       Cath 11/18/24      FINAL RECOMMENDATIONS:  FINDINGS:   Left main coronary artery: No significant lesions  Left anterior descending artery: Diffusely diseased.  In comparison to prior angiogram done 2 years ago there was a focal new 99% stenotic obstructive lesion in the proximal to mid LAD at the level of the first septal  which is thought to be the culprit lesion for the patient presentation with non-ST elevation myocardial infarction.  Distal LAD has about 50 to 60% stenosis  Left Circumflex artery: Has patent mid left circumflex stent with no evidence of in-stent restenosis.                                                OM1 and OM 2 are small size vessel                                      OM 3 is a medium size vessel estimated to be about 2.25 proximally.  Has distal 60% stenosis  Right coronary artery: Has proximal diffuse disease up to 80% stenosis which has progressed in comparison to prior angiogram done 2 years ago.                                   Mid RCA has widely patent stent with only minimal in-stent restenosis                                   Distal RCA, PDA, PL branch has mild diffuse disease  LVEDP was 6 mmHg   No gradient across the aortic valv  -The patient were encouraged to continue ASA 81 mg daily indefinitely  - Brilinta 90 mg BID for 1 year-Prescription sent   -Will consider bringing the patient back in the next few days to intervene on  the proximal RCA  -Aggressive risk factor modification   -Optimize guideline directed medical therapy  -Cardiac rehab ordered and benefits reviewed with patient.      Echo 11/19/24: EF normal, no WMA  STUDY CONCLUSIONS  SUMMARY:     1. Left ventricle: The cavity size is normal. Wall thickness is normal.     Systolic function is normal. The ejection fraction was measured by biplane     method of disks. Wall motion is normal; there are no regional wall motion     abnormalities. Grade I diastolic dysfunction. The ejection fraction is 54%.  2. Aortic valve: There is mild regurgitation.  3. Mitral valve: There is no significant regurgitation.  4. Right ventricle: The cavity size is normal. Wall thickness is normal.     Systolic function is normal.        ASSESSMENT/PLAN:  Problem List Items Addressed This Visit          Cardiac and Vasculature    Mixed hyperlipidemia     Lipid abnormalities are stable.  Pharmacotherapy as ordered.  Lipids will be reassessed in 6 months.         Relevant Medications    metoPROLOL succinate (TOPROL-XL) 50 MG 24 hr tablet    Hypertension     Hypertension is stable.  Continue current treatment regimen.  Dietary sodium restriction.  Weight loss.  Regular aerobic exercise.  Continue current medications.  Blood pressure will be reassessed at the next regular appointment.         Relevant Medications    metoPROLOL succinate (TOPROL-XL) 50 MG 24 hr tablet    NSTEMI (non-ST elevated myocardial infarction)  (CMD) - Primary     Recent Nonstemi with new LAD lesion culprit. S/P staged PCI LAD and RCA  On ASA and brilinta, advised to continue uninterrupted x 1 year  Advised to increase metoprolol to 50 mg BID  Advised to continue high intensity statin  Advised to continue GLP1.   Referred to Phase 2 cardiac rehab  Advise to jonah 911 if symptoms recur         Relevant Medications    metoPROLOL succinate (TOPROL-XL) 50 MG 24 hr tablet    Other Relevant Orders    SERVICE TO OUTPATIENT CARDIAC REHAB                  Mar Jolley,   12/9/2024   No

## 2024-12-28 NOTE — PATIENT PROFILE ADULT - DO YOU FEEL UNSAFE AT HOME, WORK, OR SCHOOL?
Problem: Adult Inpatient Plan of Care  Goal: Plan of Care Review  Flowsheets (Taken 12/27/2024 2319)  Plan of Care Reviewed With: patient  Goal: Absence of Hospital-Acquired Illness or Injury  Intervention: Identify and Manage Fall Risk  Flowsheets (Taken 12/27/2024 2319)  Safety Promotion/Fall Prevention:   assistive device/personal item within reach   medications reviewed   side rails raised x 2  Intervention: Prevent Skin Injury  Flowsheets (Taken 12/27/2024 2319)  Body Position: position changed independently  Device Skin Pressure Protection: absorbent pad utilized/changed  Intervention: Prevent Infection  Flowsheets (Taken 12/27/2024 2319)  Infection Prevention:   cohorting utilized   single patient room provided   equipment surfaces disinfected     Problem: Infection  Goal: Absence of Infection Signs and Symptoms  Outcome: Progressing     Problem: Skin Injury Risk Increased  Goal: Skin Health and Integrity  Outcome: Progressing      no

## 2025-01-22 ENCOUNTER — NON-APPOINTMENT (OUTPATIENT)
Age: 80
End: 2025-01-22

## 2025-01-22 ENCOUNTER — APPOINTMENT (OUTPATIENT)
Dept: CARDIOLOGY | Facility: CLINIC | Age: 80
End: 2025-01-22
Payer: MEDICARE

## 2025-01-22 VITALS
SYSTOLIC BLOOD PRESSURE: 130 MMHG | WEIGHT: 206 LBS | HEIGHT: 65 IN | BODY MASS INDEX: 34.32 KG/M2 | HEART RATE: 62 BPM | DIASTOLIC BLOOD PRESSURE: 70 MMHG

## 2025-01-22 DIAGNOSIS — I10 ESSENTIAL (PRIMARY) HYPERTENSION: ICD-10-CM

## 2025-01-22 DIAGNOSIS — E11.9 TYPE 2 DIABETES MELLITUS W/OUT COMPLICATIONS: ICD-10-CM

## 2025-01-22 DIAGNOSIS — R06.02 SHORTNESS OF BREATH: ICD-10-CM

## 2025-01-22 DIAGNOSIS — E78.49 OTHER HYPERLIPIDEMIA: ICD-10-CM

## 2025-01-22 DIAGNOSIS — Z87.891 PERSONAL HISTORY OF NICOTINE DEPENDENCE: ICD-10-CM

## 2025-01-22 DIAGNOSIS — I65.23 OCCLUSION AND STENOSIS OF BILATERAL CAROTID ARTERIES: ICD-10-CM

## 2025-01-22 PROCEDURE — 99204 OFFICE O/P NEW MOD 45 MIN: CPT

## 2025-01-22 PROCEDURE — G2211 COMPLEX E/M VISIT ADD ON: CPT

## 2025-01-22 PROCEDURE — 93000 ELECTROCARDIOGRAM COMPLETE: CPT

## 2025-01-22 RX ORDER — DAPAGLIFLOZIN 10 MG/1
10 TABLET, FILM COATED ORAL DAILY
Refills: 0 | Status: ACTIVE | COMMUNITY

## 2025-01-22 RX ORDER — VALSARTAN 320 MG/1
320 TABLET, COATED ORAL DAILY
Qty: 90 | Refills: 1 | Status: ACTIVE | COMMUNITY
Start: 2025-01-22 | End: 1900-01-01

## 2025-01-22 RX ORDER — METOPROLOL SUCCINATE 200 MG/1
200 TABLET, EXTENDED RELEASE ORAL DAILY
Refills: 0 | Status: ACTIVE | COMMUNITY

## 2025-01-22 RX ORDER — LEVOTHYROXINE SODIUM 150 UG/1
150 TABLET ORAL DAILY
Refills: 0 | Status: ACTIVE | COMMUNITY

## 2025-01-22 RX ORDER — ROSUVASTATIN CALCIUM 5 MG/1
5 TABLET, FILM COATED ORAL DAILY
Refills: 0 | Status: ACTIVE | COMMUNITY

## 2025-01-29 ENCOUNTER — APPOINTMENT (OUTPATIENT)
Dept: CARDIOLOGY | Facility: CLINIC | Age: 80
End: 2025-01-29
Payer: MEDICARE

## 2025-01-29 PROCEDURE — 93306 TTE W/DOPPLER COMPLETE: CPT

## 2025-01-29 PROCEDURE — 93880 EXTRACRANIAL BILAT STUDY: CPT

## 2025-01-29 PROCEDURE — 93975 VASCULAR STUDY: CPT

## 2025-01-30 LAB
ALDOSTERONE SERUM: 12.4 NG/DL
ALDOSTERONE/RENIN RATIO: 0.9 RATIO
ANION GAP SERPL CALC-SCNC: 15 MMOL/L
BUN SERPL-MCNC: 28 MG/DL
CALCIUM SERPL-MCNC: 10.1 MG/DL
CHLORIDE SERPL-SCNC: 100 MMOL/L
CO2 SERPL-SCNC: 23 MMOL/L
CREAT SERPL-MCNC: 0.9 MG/DL
EGFR: 65 ML/MIN/1.73M2
GLUCOSE SERPL-MCNC: 227 MG/DL
POTASSIUM SERPL-SCNC: 4.4 MMOL/L
RENIN PLASMA: 14.1 PG/ML
SODIUM SERPL-SCNC: 138 MMOL/L

## 2025-02-03 ENCOUNTER — OUTPATIENT (OUTPATIENT)
Dept: OUTPATIENT SERVICES | Facility: HOSPITAL | Age: 80
LOS: 1 days | End: 2025-02-03
Payer: MEDICARE

## 2025-02-03 ENCOUNTER — RESULT REVIEW (OUTPATIENT)
Age: 80
End: 2025-02-03

## 2025-02-03 DIAGNOSIS — Z00.8 ENCOUNTER FOR OTHER GENERAL EXAMINATION: ICD-10-CM

## 2025-02-03 DIAGNOSIS — Z98.42 CATARACT EXTRACTION STATUS, LEFT EYE: Chronic | ICD-10-CM

## 2025-02-03 DIAGNOSIS — R07.9 CHEST PAIN, UNSPECIFIED: ICD-10-CM

## 2025-02-03 DIAGNOSIS — Z98.890 OTHER SPECIFIED POSTPROCEDURAL STATES: Chronic | ICD-10-CM

## 2025-02-03 PROCEDURE — 78452 HT MUSCLE IMAGE SPECT MULT: CPT

## 2025-02-03 PROCEDURE — A9500: CPT

## 2025-02-04 DIAGNOSIS — R07.9 CHEST PAIN, UNSPECIFIED: ICD-10-CM

## 2025-02-12 ENCOUNTER — APPOINTMENT (OUTPATIENT)
Dept: CARDIOLOGY | Facility: CLINIC | Age: 80
End: 2025-02-12
Payer: MEDICARE

## 2025-02-12 VITALS
DIASTOLIC BLOOD PRESSURE: 80 MMHG | SYSTOLIC BLOOD PRESSURE: 130 MMHG | BODY MASS INDEX: 34.32 KG/M2 | HEIGHT: 65 IN | WEIGHT: 206 LBS

## 2025-02-12 PROCEDURE — 99214 OFFICE O/P EST MOD 30 MIN: CPT

## 2025-02-12 PROCEDURE — G2211 COMPLEX E/M VISIT ADD ON: CPT

## 2025-02-19 ENCOUNTER — APPOINTMENT (OUTPATIENT)
Dept: PULMONOLOGY | Facility: CLINIC | Age: 80
End: 2025-02-19
Payer: MEDICARE

## 2025-02-19 VITALS
BODY MASS INDEX: 34.16 KG/M2 | HEART RATE: 70 BPM | SYSTOLIC BLOOD PRESSURE: 120 MMHG | OXYGEN SATURATION: 96 % | HEIGHT: 65 IN | RESPIRATION RATE: 15 BRPM | DIASTOLIC BLOOD PRESSURE: 76 MMHG | WEIGHT: 205 LBS

## 2025-02-19 DIAGNOSIS — R06.02 SHORTNESS OF BREATH: ICD-10-CM

## 2025-02-19 DIAGNOSIS — Z87.09 PERSONAL HISTORY OF OTHER DISEASES OF THE RESPIRATORY SYSTEM: ICD-10-CM

## 2025-02-19 DIAGNOSIS — Z82.3 FAMILY HISTORY OF STROKE: ICD-10-CM

## 2025-02-19 DIAGNOSIS — Z87.891 PERSONAL HISTORY OF NICOTINE DEPENDENCE: ICD-10-CM

## 2025-02-19 DIAGNOSIS — Z86.79 PERSONAL HISTORY OF OTHER DISEASES OF THE CIRCULATORY SYSTEM: ICD-10-CM

## 2025-02-19 DIAGNOSIS — Z82.49 FAMILY HISTORY OF ISCHEMIC HEART DISEASE AND OTHER DISEASES OF THE CIRCULATORY SYSTEM: ICD-10-CM

## 2025-02-19 DIAGNOSIS — J44.9 CHRONIC OBSTRUCTIVE PULMONARY DISEASE, UNSPECIFIED: ICD-10-CM

## 2025-02-19 DIAGNOSIS — Z86.39 PERSONAL HISTORY OF OTHER ENDOCRINE, NUTRITIONAL AND METABOLIC DISEASE: ICD-10-CM

## 2025-02-19 DIAGNOSIS — G47.33 OBSTRUCTIVE SLEEP APNEA (ADULT) (PEDIATRIC): ICD-10-CM

## 2025-02-19 DIAGNOSIS — Z80.9 FAMILY HISTORY OF MALIGNANT NEOPLASM, UNSPECIFIED: ICD-10-CM

## 2025-02-19 PROCEDURE — 99204 OFFICE O/P NEW MOD 45 MIN: CPT

## 2025-02-19 PROCEDURE — G2211 COMPLEX E/M VISIT ADD ON: CPT

## 2025-02-25 ENCOUNTER — OUTPATIENT (OUTPATIENT)
Dept: OUTPATIENT SERVICES | Facility: HOSPITAL | Age: 80
LOS: 1 days | End: 2025-02-25
Payer: MEDICARE

## 2025-02-25 ENCOUNTER — APPOINTMENT (OUTPATIENT)
Dept: PULMONOLOGY | Facility: HOSPITAL | Age: 80
End: 2025-02-25

## 2025-02-25 DIAGNOSIS — R06.02 SHORTNESS OF BREATH: ICD-10-CM

## 2025-02-25 DIAGNOSIS — Z98.890 OTHER SPECIFIED POSTPROCEDURAL STATES: Chronic | ICD-10-CM

## 2025-02-25 DIAGNOSIS — Z98.42 CATARACT EXTRACTION STATUS, LEFT EYE: Chronic | ICD-10-CM

## 2025-02-25 PROCEDURE — 94729 DIFFUSING CAPACITY: CPT | Mod: 26

## 2025-02-25 PROCEDURE — 94060 EVALUATION OF WHEEZING: CPT | Mod: 26

## 2025-02-25 PROCEDURE — 94727 GAS DIL/WSHOT DETER LNG VOL: CPT

## 2025-02-25 PROCEDURE — 94070 EVALUATION OF WHEEZING: CPT

## 2025-02-25 PROCEDURE — 94727 GAS DIL/WSHOT DETER LNG VOL: CPT | Mod: 26

## 2025-02-25 PROCEDURE — 94664 DEMO&/EVAL PT USE INHALER: CPT

## 2025-02-25 PROCEDURE — 94729 DIFFUSING CAPACITY: CPT

## 2025-02-26 DIAGNOSIS — R06.02 SHORTNESS OF BREATH: ICD-10-CM

## 2025-02-28 ENCOUNTER — OUTPATIENT (OUTPATIENT)
Dept: OUTPATIENT SERVICES | Facility: HOSPITAL | Age: 80
LOS: 1 days | Discharge: HOME | End: 2025-02-28
Payer: MEDICARE

## 2025-02-28 ENCOUNTER — APPOINTMENT (OUTPATIENT)
Dept: SLEEP CENTER | Facility: HOSPITAL | Age: 80
End: 2025-02-28

## 2025-02-28 DIAGNOSIS — Z98.890 OTHER SPECIFIED POSTPROCEDURAL STATES: Chronic | ICD-10-CM

## 2025-02-28 DIAGNOSIS — Z98.42 CATARACT EXTRACTION STATUS, LEFT EYE: Chronic | ICD-10-CM

## 2025-02-28 DIAGNOSIS — G47.33 OBSTRUCTIVE SLEEP APNEA (ADULT) (PEDIATRIC): ICD-10-CM

## 2025-02-28 PROCEDURE — 95800 SLP STDY UNATTENDED: CPT | Mod: 26

## 2025-02-28 PROCEDURE — 95800 SLP STDY UNATTENDED: CPT

## 2025-03-05 DIAGNOSIS — G47.33 OBSTRUCTIVE SLEEP APNEA (ADULT) (PEDIATRIC): ICD-10-CM

## 2025-03-20 ENCOUNTER — APPOINTMENT (OUTPATIENT)
Dept: PULMONOLOGY | Facility: CLINIC | Age: 80
End: 2025-03-20

## 2025-03-31 ENCOUNTER — APPOINTMENT (OUTPATIENT)
Dept: PULMONOLOGY | Facility: CLINIC | Age: 80
End: 2025-03-31
Payer: MEDICARE

## 2025-03-31 VITALS
BODY MASS INDEX: 34.16 KG/M2 | RESPIRATION RATE: 15 BRPM | SYSTOLIC BLOOD PRESSURE: 130 MMHG | DIASTOLIC BLOOD PRESSURE: 72 MMHG | OXYGEN SATURATION: 90 % | HEIGHT: 65 IN | HEART RATE: 75 BPM | WEIGHT: 205 LBS

## 2025-03-31 DIAGNOSIS — R06.02 SHORTNESS OF BREATH: ICD-10-CM

## 2025-03-31 DIAGNOSIS — J44.9 CHRONIC OBSTRUCTIVE PULMONARY DISEASE, UNSPECIFIED: ICD-10-CM

## 2025-03-31 DIAGNOSIS — G47.33 OBSTRUCTIVE SLEEP APNEA (ADULT) (PEDIATRIC): ICD-10-CM

## 2025-03-31 DIAGNOSIS — J84.10 PULMONARY FIBROSIS, UNSPECIFIED: ICD-10-CM

## 2025-03-31 PROCEDURE — G2211 COMPLEX E/M VISIT ADD ON: CPT

## 2025-03-31 PROCEDURE — 99214 OFFICE O/P EST MOD 30 MIN: CPT

## 2025-05-13 ENCOUNTER — OUTPATIENT (OUTPATIENT)
Dept: OUTPATIENT SERVICES | Facility: HOSPITAL | Age: 80
LOS: 1 days | End: 2025-05-13
Payer: MEDICARE

## 2025-05-13 ENCOUNTER — RESULT REVIEW (OUTPATIENT)
Age: 80
End: 2025-05-13

## 2025-05-13 DIAGNOSIS — Z98.890 OTHER SPECIFIED POSTPROCEDURAL STATES: Chronic | ICD-10-CM

## 2025-05-13 DIAGNOSIS — Z00.8 ENCOUNTER FOR OTHER GENERAL EXAMINATION: ICD-10-CM

## 2025-05-13 DIAGNOSIS — R91.1 SOLITARY PULMONARY NODULE: ICD-10-CM

## 2025-05-13 DIAGNOSIS — Z98.42 CATARACT EXTRACTION STATUS, LEFT EYE: Chronic | ICD-10-CM

## 2025-05-13 PROCEDURE — 71250 CT THORAX DX C-: CPT | Mod: 26

## 2025-05-13 PROCEDURE — 71250 CT THORAX DX C-: CPT

## 2025-05-14 DIAGNOSIS — R91.1 SOLITARY PULMONARY NODULE: ICD-10-CM

## 2025-05-20 ENCOUNTER — NON-APPOINTMENT (OUTPATIENT)
Age: 80
End: 2025-05-20

## 2025-05-23 ENCOUNTER — APPOINTMENT (OUTPATIENT)
Dept: PULMONOLOGY | Facility: CLINIC | Age: 80
End: 2025-05-23

## 2025-07-16 ENCOUNTER — APPOINTMENT (OUTPATIENT)
Dept: PULMONOLOGY | Facility: CLINIC | Age: 80
End: 2025-07-16
Payer: MEDICARE

## 2025-07-16 VITALS
RESPIRATION RATE: 15 BRPM | WEIGHT: 200 LBS | HEART RATE: 62 BPM | SYSTOLIC BLOOD PRESSURE: 110 MMHG | DIASTOLIC BLOOD PRESSURE: 72 MMHG | BODY MASS INDEX: 33.32 KG/M2 | HEIGHT: 65 IN | OXYGEN SATURATION: 96 %

## 2025-07-16 PROBLEM — J44.9 CHRONIC OBSTRUCTIVE PULMONARY DISEASE, UNSPECIFIED COPD TYPE: Status: ACTIVE | Noted: 2025-07-16

## 2025-07-16 PROCEDURE — G2211 COMPLEX E/M VISIT ADD ON: CPT

## 2025-07-16 PROCEDURE — 99213 OFFICE O/P EST LOW 20 MIN: CPT

## 2025-08-13 ENCOUNTER — APPOINTMENT (OUTPATIENT)
Dept: CARDIOLOGY | Facility: CLINIC | Age: 80
End: 2025-08-13
Payer: MEDICARE

## 2025-08-13 VITALS
WEIGHT: 200 LBS | HEART RATE: 66 BPM | BODY MASS INDEX: 33.32 KG/M2 | DIASTOLIC BLOOD PRESSURE: 80 MMHG | SYSTOLIC BLOOD PRESSURE: 110 MMHG | HEIGHT: 65 IN

## 2025-08-13 DIAGNOSIS — I65.23 OCCLUSION AND STENOSIS OF BILATERAL CAROTID ARTERIES: ICD-10-CM

## 2025-08-13 DIAGNOSIS — I10 ESSENTIAL (PRIMARY) HYPERTENSION: ICD-10-CM

## 2025-08-13 DIAGNOSIS — E78.49 OTHER HYPERLIPIDEMIA: ICD-10-CM

## 2025-08-13 PROCEDURE — 99214 OFFICE O/P EST MOD 30 MIN: CPT

## 2025-08-13 PROCEDURE — G2211 COMPLEX E/M VISIT ADD ON: CPT

## 2025-08-13 PROCEDURE — 93000 ELECTROCARDIOGRAM COMPLETE: CPT

## 2025-08-13 RX ORDER — MAGNESIUM OXIDE 241.3 MG/1000MG
400 TABLET ORAL
Refills: 0 | Status: ACTIVE | COMMUNITY

## 2025-08-13 RX ORDER — INSULIN GLARGINE 100 [IU]/ML
100 INJECTION, SOLUTION SUBCUTANEOUS
Refills: 0 | Status: ACTIVE | COMMUNITY

## 2025-08-13 RX ORDER — EMPAGLIFLOZIN 25 MG/1
25 TABLET, FILM COATED ORAL DAILY
Refills: 0 | Status: ACTIVE | COMMUNITY

## 2025-08-13 RX ORDER — CYANOCOBALAMIN, ISOPROPYL ALCOHOL 1000MCG/ML
1000 KIT INJECTION AS DIRECTED
Refills: 0 | Status: ACTIVE | COMMUNITY

## 2025-08-13 RX ORDER — VALSARTAN 160 MG/1
160 TABLET, COATED ORAL DAILY
Refills: 0 | Status: ACTIVE | COMMUNITY

## 2025-08-13 RX ORDER — FAMOTIDINE 40 MG/1
40 TABLET, FILM COATED ORAL DAILY
Refills: 0 | Status: ACTIVE | COMMUNITY

## 2025-08-13 RX ORDER — POTASSIUM CITRATE 10 MEQ/1
10 MEQ TABLET, EXTENDED RELEASE ORAL DAILY
Refills: 0 | Status: ACTIVE | COMMUNITY

## 2025-08-13 RX ORDER — FEBUXOSTAT 40 MG/1
40 TABLET ORAL DAILY
Refills: 0 | Status: ACTIVE | COMMUNITY

## 2025-08-13 RX ORDER — LISINOPRIL 20 MG/1
20 TABLET ORAL DAILY
Refills: 0 | Status: ACTIVE | COMMUNITY

## 2025-08-13 RX ORDER — CHROMIUM 200 MCG
1000 TABLET ORAL
Refills: 0 | Status: ACTIVE | COMMUNITY

## 2025-08-13 RX ORDER — GLIMEPIRIDE 2 MG/1
2 TABLET ORAL TWICE DAILY
Refills: 0 | Status: ACTIVE | COMMUNITY

## 2025-08-13 RX ORDER — PANTOPRAZOLE 40 MG/1
40 TABLET, DELAYED RELEASE ORAL DAILY
Refills: 0 | Status: ACTIVE | COMMUNITY

## 2025-08-13 RX ORDER — B-COMPLEX WITH VITAMIN C
15 TABLET ORAL
Refills: 0 | Status: ACTIVE | COMMUNITY

## 2025-08-13 RX ORDER — PSYLLIUM HUSK 0.4 G
CAPSULE ORAL DAILY
Refills: 0 | Status: ACTIVE | COMMUNITY